# Patient Record
Sex: FEMALE | Race: WHITE | Employment: OTHER | URBAN - METROPOLITAN AREA
[De-identification: names, ages, dates, MRNs, and addresses within clinical notes are randomized per-mention and may not be internally consistent; named-entity substitution may affect disease eponyms.]

---

## 2017-02-10 DIAGNOSIS — M15.9 PRIMARY OSTEOARTHRITIS INVOLVING MULTIPLE JOINTS: ICD-10-CM

## 2017-02-10 DIAGNOSIS — I10 ESSENTIAL HYPERTENSION WITH GOAL BLOOD PRESSURE LESS THAN 140/90: ICD-10-CM

## 2017-02-10 RX ORDER — LOSARTAN POTASSIUM AND HYDROCHLOROTHIAZIDE 12.5; 5 MG/1; MG/1
TABLET ORAL
Qty: 30 TAB | Refills: 2 | Status: SHIPPED | OUTPATIENT
Start: 2017-02-10 | End: 2017-02-28 | Stop reason: SDUPTHER

## 2017-02-10 RX ORDER — TRAMADOL HYDROCHLORIDE 50 MG/1
TABLET ORAL
Qty: 60 TAB | Refills: 1 | Status: SHIPPED | OUTPATIENT
Start: 2017-02-10 | End: 2017-10-04 | Stop reason: SDUPTHER

## 2017-02-10 NOTE — TELEPHONE ENCOUNTER
Nurse spoke with pt in regards to message below, pt states she will call and schedule follow up appointment as soon as possible.

## 2017-02-10 NOTE — TELEPHONE ENCOUNTER
Patient is calling, patient was requesting for an appointment today with MIGUELITO Bhatia. Advised patient that the only appointment MIGUELITO Mac has is a 9:45 this morning. Patient states that she is unable to do this due to her  being bed ridden and the [facility] nurse not getting to there home before 9am to help. Patient is requesting that a nurse give her a call back, she states that she is completely out of her blood pressure medication and Tramadol and knows that MIGUELITO Mac wants her to come in before he fills it but there is just no way that she can come in before the [facility] nurse comes to her home.      Best call back # for patient: [phone]

## 2017-02-10 NOTE — TELEPHONE ENCOUNTER
[address] [place] [address] on file at [phone]. Script taken, read back and verified by \"Ranjeet\".

## 2017-02-28 ENCOUNTER — OFFICE VISIT (OUTPATIENT)
Dept: FAMILY MEDICINE CLINIC | Age: 82
End: 2017-02-28

## 2017-02-28 ENCOUNTER — HOSPITAL ENCOUNTER (OUTPATIENT)
Dept: LAB | Age: 82
Discharge: HOME | End: 2017-02-28
Payer: MEDICARE

## 2017-02-28 VITALS
HEIGHT: 65 IN | SYSTOLIC BLOOD PRESSURE: 111 MMHG | OXYGEN SATURATION: 94 % | TEMPERATURE: 98 F | BODY MASS INDEX: 26.49 KG/M2 | DIASTOLIC BLOOD PRESSURE: 70 MMHG | WEIGHT: 159 LBS | HEART RATE: 62 BPM | RESPIRATION RATE: 12 BRPM

## 2017-02-28 DIAGNOSIS — E78.2 MIXED HYPERLIPIDEMIA: ICD-10-CM

## 2017-02-28 DIAGNOSIS — F51.01 PRIMARY INSOMNIA: ICD-10-CM

## 2017-02-28 DIAGNOSIS — I10 ESSENTIAL HYPERTENSION: Primary | ICD-10-CM

## 2017-02-28 DIAGNOSIS — M15.9 PRIMARY OSTEOARTHRITIS INVOLVING MULTIPLE JOINTS: ICD-10-CM

## 2017-02-28 DIAGNOSIS — E55.9 VITAMIN D DEFICIENCY: ICD-10-CM

## 2017-02-28 DIAGNOSIS — N18.30 CKD (CHRONIC KIDNEY DISEASE) STAGE 3, GFR 30-59 ML/MIN (HCC): ICD-10-CM

## 2017-02-28 PROCEDURE — 82306 VITAMIN D 25 HYDROXY: CPT

## 2017-02-28 PROCEDURE — 80053 COMPREHEN METABOLIC PANEL: CPT

## 2017-02-28 RX ORDER — ERGOCALCIFEROL 1.25 MG/1
50000 CAPSULE ORAL
Qty: 12 CAP | Refills: 3 | Status: SHIPPED | OUTPATIENT
Start: 2017-02-28 | End: 2017-08-04

## 2017-02-28 RX ORDER — LOSARTAN POTASSIUM AND HYDROCHLOROTHIAZIDE 12.5; 5 MG/1; MG/1
0.5 TABLET ORAL DAILY
Qty: 30 TAB | Refills: 5 | Status: SHIPPED | OUTPATIENT
Start: 2017-02-28 | End: 2017-10-04 | Stop reason: SDUPTHER

## 2017-02-28 NOTE — PROGRESS NOTES
1. Have you been to the ER, urgent care clinic since your last visit? Hospitalized since your last visit? No    2. Have you seen or consulted any other health care providers outside of the [address] [place] [address] since your last visit? Include any pap smears or colon screening.  No     Chief Complaint   Patient presents with    Hypertension    Flank Pain     had a fall last week wednesday- had a bruise on right leg, and feels sore just under right breast

## 2017-02-28 NOTE — PROGRESS NOTES
HISTORY OF PRESENT ILLNESS  Edin Solis is a 80 y.o. female. HPI  Cardiovascular Review:  The patient in for follow up of hypertension. Taking Losartan-HCTZ 50-12.5 mg 1/2 tablet daily with adequate control. Home readings 130/80s. Diet and Lifestyle: generally follows a low fat low cholesterol diet, generally follows a low sodium diet, sedentary, nonsmoker  Pertinent ROS: taking medications as instructed, no medication side effects noted, no TIA's, no chest pain on exertion, no dyspnea on exertion, no swelling of ankles. Osteopenia  Patient is here for evaluation of osteoporosis. She has not had history of fracture. Repeat bone density deferred by patient. Patient does not exercise daily. Family history is positive for osteoporosis. Patient does not smoke. Patient not taking Calcium and Vitamin D regularly. Osteoarthritis and Chronic Pain:  Patient has osteoarthritis, primarily affecting the back. Symptoms onset: problem is longstanding. Rheumatological ROS: using NSAID medication intermittently as needed and using Tramadol intermittently. Response to treatment plan: symptoms have progressed to a point and plateaued. Current Outpatient Prescriptions   Medication Sig Dispense Refill    losartan-hydroCHLOROthiazide (HYZAAR) 50-12.5 mg per tablet Take 0.5 Tabs by mouth daily. For blood pressure 30 Tab 5    ergocalciferol (ERGOCALCIFEROL) 50,000 unit capsule Take 1 Cap by mouth every seven (7) days. 12 Cap 3    traMADol (ULTRAM) 50 mg tablet TAKE 1 TABLET BY MOUTH EVERY 8 HOURS AS NEEDED FOR PAIN 60 Tab 1    Calcium Carbonate-Vit D3-Min 600 mg calcium- 400 unit tab Take 1 Tab by mouth two (2) times a day. For calcium and vitamin D 60 Tab prn    aspirin (ASPIRIN) 325 mg tablet Take 325 mg by mouth daily.  ibuprofen (ADVIL) 200 mg tablet Take 200 mg by mouth every eight (8) hours as needed for Pain.       travoprost (TRAVATAN Z) 0.004 % ophthalmic solution Administer 1 Drop to both eyes every evening.  dorzolamide (TRUSOPT) 2 % ophthalmic solution Administer 2 Drops to both eyes two (2) times a day. Indications: OPEN ANGLE GLAUCOMA      ALPHAGAN P 0.1 % Drop Apply 2 Drops to eye two (2) times a day. Past Medical History:   Diagnosis Date    AR (allergic rhinitis) 5/4/2010    Asthmatic bronchitis 5/4/2010    DJD (degenerative joint disease) 5/4/2010    Glaucoma 5/4/2010    HTN (hypertension) 5/4/2010    Mixed hyperlipidemia     normal particle number 4/2012    Osteopenia 5/4/2010    TMJ (temporomandibular joint disorder) 5/4/2010    Viral labyrinthitis 5/4/2010      Lab Results   Component Value Date/Time    Cholesterol, total 210 03/17/2015 08:54 AM    HDL Cholesterol 78 03/17/2015 08:54 AM    LDL, calculated 109 03/17/2015 08:54 AM    Triglyceride 117 03/17/2015 08:54 AM    CHOL/HDL Ratio 3.9 11/05/2010 08:31 AM     Lab Results   Component Value Date/Time    GFR est AA 54 09/21/2016 12:55 PM    GFR est non-AA 47 09/21/2016 12:55 PM    Creatinine 1.09 09/21/2016 12:55 PM    BUN 26 09/21/2016 12:55 PM    Sodium 141 09/21/2016 12:55 PM    Potassium 3.9 09/21/2016 12:55 PM    Chloride 102 09/21/2016 12:55 PM    CO2 24 09/21/2016 12:55 PM         Review of Systems   Constitutional: Negative for malaise/fatigue and weight loss. Respiratory: Negative for shortness of breath. Cardiovascular: Negative for chest pain, palpitations and leg swelling. Musculoskeletal: Positive for back pain and joint pain. Negative for myalgias. Neurological: Negative for weakness and headaches. Psychiatric/Behavioral: Negative for depression. The patient is not nervous/anxious and does not have insomnia. Physical Exam   Constitutional: She is oriented to person, place, and time. She appears well-developed and well-nourished. Neck: Normal range of motion. Neck supple. No JVD present. Carotid bruit is not present. No thyromegaly present.    Cardiovascular: Normal rate, regular rhythm and intact distal pulses. Exam reveals no gallop and no friction rub. No murmur heard. Pulmonary/Chest: Effort normal and breath sounds normal. No respiratory distress. Musculoskeletal: She exhibits no edema. Lymphadenopathy:     She has no cervical adenopathy. Neurological: She is alert and oriented to person, place, and time. Psychiatric: She has a normal mood and affect. Her behavior is normal.   Nursing note and vitals reviewed. ASSESSMENT and PLAN  Norah Hassan was seen today for hypertension and flank pain. Diagnoses and all orders for this visit:    Essential hypertension  Well controlled, no medication changes. -     losartan-hydroCHLOROthiazide (HYZAAR) 50-12.5 mg per tablet; Take 0.5 Tabs by mouth daily. For blood pressure  -     METABOLIC PANEL, COMPREHENSIVE    Vitamin D deficiency  Begin weekly supplement. Recheck level. -     ergocalciferol (ERGOCALCIFEROL) 50,000 unit capsule; Take 1 Cap by mouth every seven (7) days. -     VITAMIN D, 25 HYDROXY    Mixed hyperlipidemia  Stable, no changes to current therapy    CKD (chronic kidney disease) stage 3, GFR 30-59 ml/min  Will monitor. Discussed limiting NSAID use. Primary osteoarthritis involving multiple joints  Stable on Tramadol. I have discussed the diagnosis with the patient and the intended plan as seen in the above orders. The patient has received an after-visit summary along with patient information handout. I have discussed medication side effects and warnings with the patient as well. Follow-up Disposition:  Return in about 6 months (around 8/28/2017) for blood pressure.

## 2017-02-28 NOTE — MR AVS SNAPSHOT
Visit Information Date & Time Provider Department Dept. Phone Encounter #  
 2/28/2017 11:30 AM Mathew Sexton  [facility] [phone] [id number] Follow-up Instructions Return in about 6 months (around 8/28/2017) for blood pressure. Upcoming Health Maintenance Date Due DTaP/Tdap/Td series (1 - Tdap) 9/10/1953 Pneumococcal 65+ Low/Medium Risk (1 of 2 - PCV13) 9/10/1997 INFLUENZA AGE 9 TO ADULT 8/1/2016 MEDICARE YEARLY EXAM 4/13/2017 GLAUCOMA SCREENING Q2Y 8/9/2018 Allergies as of 2/28/2017  Review Complete On: 2/28/2017 By: Mathew Sexton NP Severity Noted Reaction Type Reactions Asmanex Twisthaler [Mometasone]  07/27/2012    Other (comments) Dizziness and mouth sores Fosamax [Alendronate]  05/04/2010    Other (comments) Leg cramps Prednisone  05/17/2011    Anxiety Current Immunizations  Never Reviewed Name Date Influenza High Dose Vaccine PF 10/7/2015 Not reviewed this visit You Were Diagnosed With   
  
 Codes Comments Essential hypertension    -  Primary ICD-10-CM: I10 
ICD-9-CM: 401.9 Vitamin D deficiency     ICD-10-CM: E55.9 ICD-9-CM: 268.9 Mixed hyperlipidemia     ICD-10-CM: E78.2 ICD-9-CM: 272.2 CKD (chronic kidney disease) stage 3, GFR 30-59 ml/min     ICD-10-CM: N18.3 ICD-9-CM: 585.3 Primary osteoarthritis involving multiple joints     ICD-10-CM: M15.0 ICD-9-CM: 715.09 Primary insomnia     ICD-10-CM: F51.01 
ICD-9-CM: 307.42 Vitals BP  
  
  
  
  
  
 111/70 (BP 1 Location: Left arm, BP Patient Position: Sitting) Vitals History BMI and BSA Data Body Mass Index Body Surface Area  
 26.46 kg/m 2 1.82 m 2 Preferred Pharmacy Pharmacy Name Phone [facility] [address] [place] [address], [address] [place] [address] [phone] Your Updated Medication List  
  
   
 This list is accurate as of: 17 12:12 PM.  Always use your most recent med list. ADVIL 200 mg tablet Generic drug:  ibuprofen Take 200 mg by mouth every eight (8) hours as needed for Pain. ALPHAGAN P 0.1 % ophthalmic solution Generic drug:  brimonidine Apply 2 Drops to eye two (2) times a day. aspirin 325 mg tablet Commonly known as:  ASPIRIN Take 325 mg by mouth daily. Calcium Carbonate-Vit D3-Min 600 mg calcium- 400 unit Tab Take 1 Tab by mouth two (2) times a day. For calcium and vitamin D  
  
 dorzolamide 2 % ophthalmic solution Commonly known as:  TRUSOPT Administer 2 Drops to both eyes two (2) times a day. Indications: OPEN ANGLE GLAUCOMA  
  
 ergocalciferol 50,000 unit capsule Commonly known as:  ERGOCALCIFEROL Take 1 Cap by mouth every seven (7) days. losartan-hydroCHLOROthiazide 50-12.5 mg per tablet Commonly known as:  HYZAAR Take 0.5 Tabs by mouth daily. For blood pressure  
  
 traMADol 50 mg tablet Commonly known as:  ULTRAM  
TAKE 1 TABLET BY MOUTH EVERY 8 HOURS AS NEEDED FOR PAIN  
  
 TRAVATAN Z 0.004 % ophthalmic solution Generic drug:  travoprost  
Administer 1 Drop to both eyes every evening. Prescriptions Sent to Pharmacy Refills  
 losartan-hydroCHLOROthiazide (HYZAAR) 50-12.5 mg per tablet 5 Sig: Take 0.5 Tabs by mouth daily. For blood pressure Class: Normal  
 Pharmacy: [facility] [address], [address] [place] of [address] Ph #: [phone] Route: Oral  
 ergocalciferol (ERGOCALCIFEROL) 50,000 unit capsule 3 Sig: Take 1 Cap by mouth every seven (7) days. Class: Normal  
 Pharmacy: [facility] [address], [address] [place] of [address] Ph #: [phone] Route: Oral  
  
Follow-up Instructions Return in about 6 months (around 2017) for blood pressure. Patient Instructions Learning About High Blood Pressure What is high blood pressure? Blood pressure is a measure of how hard the blood pushes against the walls of your arteries. It's normal for blood pressure to go up and down throughout the day, but if it stays up, you have high blood pressure. Another name for high blood pressure is hypertension. Two numbers tell you your blood pressure. The first number is the systolic pressure. It shows how hard the blood pushes when your heart is pumping. The second number is the diastolic pressure. It shows how hard the blood pushes between heartbeats, when your heart is relaxed and filling with blood. A blood pressure of less than 120/80 (say \"120 over 80\") is ideal for an adult. High blood pressure is 140/90 or higher. You have high blood pressure if your top number is 140 or higher or your bottom number is 90 or higher, or both. Many people fall into the category in between, called prehypertension. People with prehypertension need to make lifestyle changes to bring their blood pressure down and help prevent or delay high blood pressure. What happens when you have high blood pressure? · Blood flows through your arteries with too much force. Over time, this damages the walls of your arteries. But you can't feel it. High blood pressure usually doesn't cause symptoms. · Fat and calcium start to build up in your arteries. This buildup is called plaque. Plaque makes your arteries narrower and stiffer. Blood can't flow through them as easily. · This lack of good blood flow starts to damage some of the organs in your body. This can lead to problems such as coronary artery disease and heart attack, heart failure, stroke, kidney failure, and eye damage. How can you prevent high blood pressure? · Stay at a healthy weight. · Try to limit how much sodium you eat to less than 2,300 milligrams (mg) a day.  If you limit your sodium to 1,500 mg a day, you can lower your blood pressure even more. ¨ Buy foods that are labeled \"unsalted,\" \"sodium-free,\" or \"low-sodium. \" Foods labeled \"reduced-sodium\" and \"light sodium\" may still have too much sodium. ¨ Flavor your food with garlic, lemon juice, onion, vinegar, herbs, and spices instead of salt. Do not use soy sauce, steak sauce, onion salt, garlic salt, mustard, or ketchup on your food. ¨ Use less salt (or none) when recipes call for it. You can often use half the salt a recipe calls for without losing flavor. · Be physically active. Get at least 30 minutes of exercise on most days of the week. Walking is a good choice. You also may want to do other activities, such as running, swimming, cycling, or playing tennis or team sports. · Limit alcohol to 2 drinks a day for men and 1 drink a day for women. · Eat plenty of fruits, vegetables, and low-fat dairy products. Eat less saturated and total fats. How is high blood pressure treated? · Your doctor will suggest making lifestyle changes. For example, your doctor may ask you to eat healthy foods, quit smoking, lose extra weight, and be more active. · If lifestyle changes don't help enough or your blood pressure is very high, you will have to take medicine every day. Follow-up care is a key part of your treatment and safety. Be sure to make and go to all appointments, and call your doctor if you are having problems. It's also a good idea to know your test results and keep a list of the medicines you take. Where can you learn more? Go to http://mehreen-grzegozr.info/. Enter P501 in the search box to learn more about \"Learning About High Blood Pressure. \" Current as of: March 23, 2016 Content Version: 11.1 © 2180-1789 [facility]. Care instructions adapted under license by [employer] (which disclaims liability or warranty for this information).  If you have questions about a medical condition or this instruction, always ask your healthcare professional. [address] any warranty or liability for your use of this information. Medicines to Avoid With Kidney Disease: Care Instructions Your Care Instructions Kidney disease means that your kidneys are not able to get rid of waste from the blood. So they can't keep your body's fluids and chemicals in balance. Usually, the kidneys get rid of waste from the blood through the urine. And they balance the fluids in the body. When your kidneys don't work as they should, you have to be careful about some medicines. They may harm your kidneys. Your doctor may tell you not to take them. Or he or she may change the dose. Medicines for pain and swelling, such as ibuprofen (Advil or Motrin) or naproxen (Aleve), can cause harm. So can some antibiotics and antacids. And you need to be careful about some drugs that treat cancer, lower blood pressure, or get rid of water from the body. Some herbal products could cause harm too. Follow-up care is a key part of your treatment and safety. Be sure to make and go to all appointments, and call your doctor if you are having problems. It's also a good idea to know your test results and keep a list of the medicines you take. How can you care for yourself at home? · Tell your doctor all the prescription, herbal, or over-the-counter medicines you take. Do not take any new ones unless you talk to your doctor first. 
· Do not take anti-inflammatory medicines. These include ibuprofen (Advil, Motrin) and naproxen (Aleve). You can use acetaminophen (Tylenol) for pain. · Do not take two or more pain medicines at the same time unless the doctor told you to. Many pain medicines have acetaminophen, which is Tylenol. Too much acetaminophen (Tylenol) can be harmful. · Tell all doctors and others who work with your health care that you have kidney disease. · Wear medical alert jewelry that lists your health problem. You can buy this at most drugstores. Where can you learn more? Go to http://mehreen-grzegorz.info/. Enter W118 in the search box to learn more about \"Medicines to Avoid With Kidney Disease: Care Instructions. \" Current as of: November 20, 2015 Content Version: 11.1 © 1092-6771 [employer]. Care instructions adapted under license by [employer] (which disclaims liability or warranty for this information). If you have questions about a medical condition or this instruction, always ask your healthcare professional. [address] any warranty or liability for your use of this information. Introducing [facility]! Sonia Ford introduces [employer] patient portal. Now you can access parts of your medical record, email your doctor's office, and request medication refills online. 1. In your internet browser, go to [email or web address] 2. Click on the First Time User? Click Here link in the Sign In box. You will see the New Member Sign Up page. 3. Enter your [employer] Access Code exactly as it appears below. You will not need to use this code after youve completed the sign-up process. If you do not sign up before the expiration date, you must request a new code. · [employer] Access Code: [email or web address] Expires: 5/29/2017 12:12 PM 
 
4. Enter the last four digits of your Social Security Number (xxxx) and Date of Birth (mm/dd/yyyy) as indicated and click Submit. You will be taken to the next sign-up page. 5. Create a [employer] ID. This will be your [employer]ik login ID and cannot be changed, so think of one that is secure and easy to remember. 6. Create a [employer] password. You can change your password at any time. 7. Enter your Password Reset Question and Answer. This can be used at a later time if you forget your password. 8. Enter your e-mail address. You will receive e-mail notification when new information is available in [address] 9. Click Sign Up. You can now view and download portions of your medical record. 10. Click the Download Summary menu link to download a portable copy of your medical information. If you have questions, please visit the Frequently Asked Questions section of the [employer] website. Remember, [employer] is NOT to be used for urgent needs. For medical emergencies, dial 911. Now available from your iPhone and Android! Please provide this summary of care documentation to your next provider. Your primary care clinician is listed as Yeison Pisano. If you have any questions after today's visit, please call [phone].

## 2017-02-28 NOTE — PATIENT INSTRUCTIONS
Learning About High Blood Pressure  What is high blood pressure? Blood pressure is a measure of how hard the blood pushes against the walls of your arteries. It's normal for blood pressure to go up and down throughout the day, but if it stays up, you have high blood pressure. Another name for high blood pressure is hypertension. Two numbers tell you your blood pressure. The first number is the systolic pressure. It shows how hard the blood pushes when your heart is pumping. The second number is the diastolic pressure. It shows how hard the blood pushes between heartbeats, when your heart is relaxed and filling with blood. A blood pressure of less than 120/80 (say \"120 over 80\") is ideal for an adult. High blood pressure is 140/90 or higher. You have high blood pressure if your top number is 140 or higher or your bottom number is 90 or higher, or both. Many people fall into the category in between, called prehypertension. People with prehypertension need to make lifestyle changes to bring their blood pressure down and help prevent or delay high blood pressure. What happens when you have high blood pressure? · Blood flows through your arteries with too much force. Over time, this damages the walls of your arteries. But you can't feel it. High blood pressure usually doesn't cause symptoms. · Fat and calcium start to build up in your arteries. This buildup is called plaque. Plaque makes your arteries narrower and stiffer. Blood can't flow through them as easily. · This lack of good blood flow starts to damage some of the organs in your body. This can lead to problems such as coronary artery disease and heart attack, heart failure, stroke, kidney failure, and eye damage. How can you prevent high blood pressure? · Stay at a healthy weight. · Try to limit how much sodium you eat to less than 2,300 milligrams (mg) a day.  If you limit your sodium to 1,500 mg a day, you can lower your blood pressure even more.  ¨ Buy foods that are labeled \"unsalted,\" \"sodium-free,\" or \"low-sodium. \" Foods labeled \"reduced-sodium\" and \"light sodium\" may still have too much sodium. ¨ Flavor your food with garlic, lemon juice, onion, vinegar, herbs, and spices instead of salt. Do not use soy sauce, steak sauce, onion salt, garlic salt, mustard, or ketchup on your food. ¨ Use less salt (or none) when recipes call for it. You can often use half the salt a recipe calls for without losing flavor. · Be physically active. Get at least 30 minutes of exercise on most days of the week. Walking is a good choice. You also may want to do other activities, such as running, swimming, cycling, or playing tennis or team sports. · Limit alcohol to 2 drinks a day for men and 1 drink a day for women. · Eat plenty of fruits, vegetables, and low-fat dairy products. Eat less saturated and total fats. How is high blood pressure treated? · Your doctor will suggest making lifestyle changes. For example, your doctor may ask you to eat healthy foods, quit smoking, lose extra weight, and be more active. · If lifestyle changes don't help enough or your blood pressure is very high, you will have to take medicine every day. Follow-up care is a key part of your treatment and safety. Be sure to make and go to all appointments, and call your doctor if you are having problems. It's also a good idea to know your test results and keep a list of the medicines you take. Where can you learn more? Go to http://mehreen-grzegorz.info/. Enter P501 in the search box to learn more about \"Learning About High Blood Pressure. \"  Current as of: March 23, 2016  Content Version: 11.1  © 5468-4603 [employer]. Care instructions adapted under license by [employer] (which disclaims liability or warranty for this information).  If you have questions about a medical condition or this instruction, always ask your healthcare professional. [email or web address], [facility] disclaims any warranty or liability for your use of this information. Medicines to Avoid With Kidney Disease: Care Instructions  Your Care Instructions  Kidney disease means that your kidneys are not able to get rid of waste from the blood. So they can't keep your body's fluids and chemicals in balance. Usually, the kidneys get rid of waste from the blood through the urine. And they balance the fluids in the body. When your kidneys don't work as they should, you have to be careful about some medicines. They may harm your kidneys. Your doctor may tell you not to take them. Or he or she may change the dose. Medicines for pain and swelling, such as ibuprofen (Advil or Motrin) or naproxen (Aleve), can cause harm. So can some antibiotics and antacids. And you need to be careful about some drugs that treat cancer, lower blood pressure, or get rid of water from the body. Some herbal products could cause harm too. Follow-up care is a key part of your treatment and safety. Be sure to make and go to all appointments, and call your doctor if you are having problems. It's also a good idea to know your test results and keep a list of the medicines you take. How can you care for yourself at home? · Tell your doctor all the prescription, herbal, or over-the-counter medicines you take. Do not take any new ones unless you talk to your doctor first.  · Do not take anti-inflammatory medicines. These include ibuprofen (Advil, Motrin) and naproxen (Aleve). You can use acetaminophen (Tylenol) for pain. · Do not take two or more pain medicines at the same time unless the doctor told you to. Many pain medicines have acetaminophen, which is Tylenol. Too much acetaminophen (Tylenol) can be harmful. · Tell all doctors and others who work with your health care that you have kidney disease. · Wear medical alert jewelry that lists your health problem. You can buy this at most drugsStkr.ites.   Where can you learn more? Go to http://mehreen-grzegorz.info/. Enter R528 in the search box to learn more about \"Medicines to Avoid With Kidney Disease: Care Instructions. \"  Current as of: November 20, 2015  Content Version: 11.1  © 1752-7199 [employer], [employer]. Care instructions adapted under license by [employer] (which disclaims liability or warranty for this information). If you have questions about a medical condition or this instruction, always ask your healthcare professional. [place] [address] any warranty or liability for your use of this information.

## 2017-03-01 LAB
25(OH)D3+25(OH)D2 SERPL-MCNC: 12.1 NG/ML (ref 30–100)
ALBUMIN SERPL-MCNC: 4.1 G/DL (ref 3.5–4.7)
ALBUMIN/GLOB SERPL: 1.8 {RATIO} (ref 1.1–2.5)
ALP SERPL-CCNC: 95 IU/L (ref 39–117)
ALT SERPL-CCNC: 15 IU/L (ref 0–32)
AST SERPL-CCNC: 19 IU/L (ref 0–40)
BILIRUB SERPL-MCNC: 0.3 MG/DL (ref 0–1.2)
BUN SERPL-MCNC: 20 MG/DL (ref 8–27)
BUN/CREAT SERPL: 22 (ref 11–26)
CALCIUM SERPL-MCNC: 9.4 MG/DL (ref 8.7–10.3)
CHLORIDE SERPL-SCNC: 102 MMOL/L (ref 96–106)
CO2 SERPL-SCNC: 26 MMOL/L (ref 18–29)
CREAT SERPL-MCNC: 0.91 MG/DL (ref 0.57–1)
GLOBULIN SER CALC-MCNC: 2.3 G/DL (ref 1.5–4.5)
GLUCOSE SERPL-MCNC: 84 MG/DL (ref 65–99)
INTERPRETATION: NORMAL
POTASSIUM SERPL-SCNC: 4 MMOL/L (ref 3.5–5.2)
PROT SERPL-MCNC: 6.4 G/DL (ref 6–8.5)
SODIUM SERPL-SCNC: 141 MMOL/L (ref 134–144)

## 2017-03-06 ENCOUNTER — TELEPHONE (OUTPATIENT)
Dept: FAMILY MEDICINE CLINIC | Age: 82
End: 2017-03-06

## 2017-03-06 NOTE — TELEPHONE ENCOUNTER
[phone] (home)   Notified patient of her labs per patient vitamin  D 52604 units were $30.00 she said she cant afford that.  Per patient she is taking vitamin D 2000 did you want her to take more than 2000 please advised

## 2017-03-06 NOTE — TELEPHONE ENCOUNTER
----- Message from Lashaun Arrington NP sent at 3/5/2017  8:24 PM EST -----  Patient's kidney function was improved. Her vitamin D level was low. I recommend the prescription supplement of vitamin D3 27694 units once weekly as prescribed.

## 2017-03-06 NOTE — PROGRESS NOTES
Patient's kidney function was improved. Her vitamin D level was low. I recommend the prescription supplement of vitamin D3 79868 units once weekly as prescribed.

## 2017-03-06 NOTE — PROGRESS NOTES
[phone] ([place]) verified   Patient notified of above note and voiced understanding of what was read.

## 2017-04-25 ENCOUNTER — TELEPHONE (OUTPATIENT)
Dept: FAMILY MEDICINE CLINIC | Age: 82
End: 2017-04-25

## 2017-04-25 NOTE — TELEPHONE ENCOUNTER
----- Message from [employer] sent at 4/25/2017  7:15 AM EDT -----  Regarding: MIGUELITO Bhatia/Telephone  Contact: [phone]  Renato Crowder has a \"terrible\" cough that may possibly be bronchitis, pt specifically wants to see NP [facility] 4/25/2017

## 2017-04-25 NOTE — TELEPHONE ENCOUNTER
[phone] spoke to patient advised of [address] being booked today we can get her in 4/26/2017 at 845 A patient understand

## 2017-04-26 ENCOUNTER — OFFICE VISIT (OUTPATIENT)
Dept: FAMILY MEDICINE CLINIC | Age: 82
End: 2017-04-26

## 2017-04-26 VITALS
HEIGHT: 65 IN | DIASTOLIC BLOOD PRESSURE: 72 MMHG | SYSTOLIC BLOOD PRESSURE: 158 MMHG | RESPIRATION RATE: 12 BRPM | OXYGEN SATURATION: 97 % | WEIGHT: 160.2 LBS | TEMPERATURE: 97.2 F | HEART RATE: 76 BPM | BODY MASS INDEX: 26.69 KG/M2

## 2017-04-26 DIAGNOSIS — Z71.89 ACP (ADVANCE CARE PLANNING): ICD-10-CM

## 2017-04-26 DIAGNOSIS — Z00.00 MEDICARE ANNUAL WELLNESS VISIT, SUBSEQUENT: ICD-10-CM

## 2017-04-26 DIAGNOSIS — J40 BRONCHITIS: Primary | ICD-10-CM

## 2017-04-26 DIAGNOSIS — I10 ESSENTIAL HYPERTENSION: ICD-10-CM

## 2017-04-26 RX ORDER — CODEINE PHOSPHATE AND GUAIFENESIN 10; 100 MG/5ML; MG/5ML
5 SOLUTION ORAL
Qty: 120 ML | Refills: 0 | Status: SHIPPED | OUTPATIENT
Start: 2017-04-26 | End: 2017-08-04

## 2017-04-26 RX ORDER — AZITHROMYCIN 250 MG/1
TABLET, FILM COATED ORAL
Qty: 6 TAB | Refills: 0 | Status: SHIPPED | OUTPATIENT
Start: 2017-04-26 | End: 2017-05-01

## 2017-04-26 NOTE — MR AVS SNAPSHOT
Visit Information Date & Time Provider Department Dept. Phone Encounter #  
 4/26/2017  8:45 AM Jose Ramon Doll NP [facility] [phone] [id number] Follow-up Instructions Return if symptoms worsen or fail to improve. Your Appointments 8/29/2017  9:00 AM  
ROUTINE CARE with Jose Ramon Doll NP [facility] ([facility]) Appt Note: 6 months follow up visit [phone]  
[phone]  
  
   
 [phone] Upcoming Health Maintenance Date Due DTaP/Tdap/Td series (1 - Tdap) 9/10/1953 Pneumococcal 65+ Low/Medium Risk (1 of 2 - PCV13) 9/10/1997 INFLUENZA AGE 9 TO ADULT 8/1/2016 MEDICARE YEARLY EXAM 4/13/2017 GLAUCOMA SCREENING Q2Y 8/9/2018 Allergies as of 4/26/2017  Review Complete On: 4/26/2017 By: Jose Ramon Doll NP Severity Noted Reaction Type Reactions Asmanex Twisthaler [Mometasone]  07/27/2012    Other (comments) Dizziness and mouth sores Fosamax [Alendronate]  05/04/2010    Other (comments) Leg cramps Prednisone  05/17/2011    Anxiety Current Immunizations  Never Reviewed Name Date Influenza High Dose Vaccine PF 10/7/2015 Not reviewed this visit You Were Diagnosed With   
  
 Codes Comments Bronchitis    -  Primary ICD-10-CM: Y75 ICD-9-CM: 308 Essential hypertension     ICD-10-CM: I10 
ICD-9-CM: 401.9 Vitals BP Pulse Temp Resp Height(growth percentile) Weight(growth percentile) 158/72 (BP 1 Location: Left arm, BP Patient Position: Sitting) 76 97.2 °F (36.2 °C) (Oral) 12 5' 5\" (1.651 m) 160 lb 3.2 oz (72.7 kg) SpO2 BMI OB Status Smoking Status 97% 26.66 kg/m2 Postmenopausal Former Smoker Vitals History BMI and BSA Data Body Mass Index Body Surface Area  
 26.66 kg/m 2 1.83 m 2 Preferred Pharmacy Pharmacy Name Phone [facility] [address] [place] [address], [facility] Ruben aMys [phone] Your Updated Medication List  
  
   
This list is accurate as of: 4/26/17  9:14 AM.  Always use your most recent med list. ADVIL 200 mg tablet Generic drug:  ibuprofen Take 200 mg by mouth every eight (8) hours as needed for Pain. ALPHAGAN P 0.1 % ophthalmic solution Generic drug:  brimonidine Apply 2 Drops to eye two (2) times a day. aspirin 325 mg tablet Commonly known as:  ASPIRIN Take 325 mg by mouth daily. azithromycin 250 mg tablet Commonly known as:  Elyce Kaushal Take 2 tablets today, then take 1 tablet daily Calcium Carbonate-Vit D3-Min 600 mg calcium- 400 unit Tab Take 1 Tab by mouth two (2) times a day. For calcium and vitamin D  
  
 dorzolamide 2 % ophthalmic solution Commonly known as:  TRUSOPT Administer 2 Drops to both eyes two (2) times a day. Indications: OPEN ANGLE GLAUCOMA  
  
 ergocalciferol 50,000 unit capsule Commonly known as:  ERGOCALCIFEROL Take 1 Cap by mouth every seven (7) days. guaiFENesin-codeine 100-10 mg/5 mL solution Commonly known as:  ROBITUSSIN AC Take 5 mL by mouth three (3) times daily as needed for Cough. Max Daily Amount: 15 mL. losartan-hydroCHLOROthiazide 50-12.5 mg per tablet Commonly known as:  HYZAAR Take 0.5 Tabs by mouth daily. For blood pressure  
  
 traMADol 50 mg tablet Commonly known as:  ULTRAM  
TAKE 1 TABLET BY MOUTH EVERY 8 HOURS AS NEEDED FOR PAIN  
  
 TRAVATAN Z 0.004 % ophthalmic solution Generic drug:  travoprost  
Administer 1 Drop to both eyes every evening. Prescriptions Printed Refills  
 azithromycin (ZITHROMAX) 250 mg tablet 0 Sig: Take 2 tablets today, then take 1 tablet daily  Class: Print  
 guaiFENesin-codeine (ROBITUSSIN AC) 100-10 mg/5 mL solution 0  
 Sig: Take 5 mL by mouth three (3) times daily as needed for Cough. Max Daily Amount: 15 mL. Class: Print Route: Oral  
  
Follow-up Instructions Return if symptoms worsen or fail to improve. Patient Instructions Bronchitis: Care Instructions Your Care Instructions Bronchitis is inflammation of the bronchial tubes, which carry air to the lungs. The tubes swell and produce mucus, or phlegm. The mucus and inflamed bronchial tubes make you cough. You may have trouble breathing. Most cases of bronchitis are caused by viruses like those that cause colds. Antibiotics usually do not help and they may be harmful. Bronchitis usually develops rapidly and lasts about 2 to 3 weeks in otherwise healthy people. Follow-up care is a key part of your treatment and safety. Be sure to make and go to all appointments, and call your doctor if you are having problems. It's also a good idea to know your test results and keep a list of the medicines you take. How can you care for yourself at home? · Take all medicines exactly as prescribed. Call your doctor if you think you are having a problem with your medicine. · Get some extra rest. 
· Take an over-the-counter pain medicine, such as acetaminophen (Tylenol), ibuprofen (Advil, Motrin), or naproxen (Aleve) to reduce fever and relieve body aches. Read and follow all instructions on the label. · Do not take two or more pain medicines at the same time unless the doctor told you to. Many pain medicines have acetaminophen, which is Tylenol. Too much acetaminophen (Tylenol) can be harmful. · Take an over-the-counter cough medicine that contains dextromethorphan to help quiet a dry, hacking cough so that you can sleep. Avoid cough medicines that have more than one active ingredient. Read and follow all instructions on the label.  
· Breathe moist air from a humidifier, hot shower, or sink filled with hot water. The heat and moisture will thin mucus so you can cough it out. · Do not smoke. Smoking can make bronchitis worse. If you need help quitting, talk to your doctor about stop-smoking programs and medicines. These can increase your chances of quitting for good. When should you call for help? Call 911 anytime you think you may need emergency care. For example, call if: 
· You have severe trouble breathing. Call your doctor now or seek immediate medical care if: 
· You have new or worse trouble breathing. · You cough up dark brown or bloody mucus (sputum). · You have a new or higher fever. · You have a new rash. Watch closely for changes in your health, and be sure to contact your doctor if: 
· You cough more deeply or more often, especially if you notice more mucus or a change in the color of your mucus. · You are not getting better as expected. Where can you learn more? Go to http://mehreen-grzegorz.info/. Enter H333 in the search box to learn more about \"Bronchitis: Care Instructions. \" Current as of: May 23, 2016 Content Version: 11.2 © 9869-8271 [employer]. Care instructions adapted under license by [employer] (which disclaims liability or warranty for this information). If you have questions about a medical condition or this instruction, always ask your healthcare professional. [address] any warranty or liability for your use of this information. Introducing [facility]! Adebayo Ram introduces Yapmo patient portal. Now you can access parts of your medical record, email your doctor's office, and request medication refills online. 1. In your internet browser, go to [email or web address]. [email or web address] 2. Click on the First Time User? Click Here link in the Sign In box. You will see the New Member Sign Up page. 3. Enter your Yapmo Access Code exactly as it appears below.  You will not need to use this code after youve completed the sign-up process. If you do not sign up before the expiration date, you must request a new code. · [employer] Access Code: [email or web address] Expires: 5/29/2017  1:12 PM 
 
4. Enter the last four digits of your Social Security Number (xxxx) and Date of Birth (mm/dd/yyyy) as indicated and click Submit. You will be taken to the next sign-up page. 5. Create a [employer] ID. This will be your [employer] login ID and cannot be changed, so think of one that is secure and easy to remember. 6. Create a [employer] password. You can change your password at any time. 7. Enter your Password Reset Question and Answer. This can be used at a later time if you forget your password. 8. Enter your e-mail address. You will receive e-mail notification when new information is available in [address]. 9. Click Sign Up. You can now view and download portions of your medical record. 10. Click the Download Summary menu link to download a portable copy of your medical information. If you have questions, please visit the Frequently Asked Questions section of the [employer] website. Remember, [employer] is NOT to be used for urgent needs. For medical emergencies, dial 911. Now available from your iPhone and Android! Please provide this summary of care documentation to your next provider. Your primary care clinician is listed as Lesly Stephenson. If you have any questions after today's visit, please call [phone].

## 2017-04-26 NOTE — PROGRESS NOTES
1. Have you been to the ER, urgent care clinic since your last visit? Hospitalized since your last visit? No    2. Have you seen or consulted any other health care providers outside of the [address] [place] [address] since your last visit? Include any pap smears or colon screening.  No     Chief Complaint   Patient presents with    Cough     affecting sleep- producing a clear mucus- began on saturday

## 2017-04-26 NOTE — PROGRESS NOTES
Nery Domingo is a 80 y.o. female and presents for annual Medicare Wellness Visit. Problem List: Reviewed with patient and discussed risk factors. Patient Active Problem List   Diagnosis Code    DJD (degenerative joint disease) M19.90    HTN (hypertension) I10    AR (allergic rhinitis) J30.9    Glaucoma H40.9    TMJ (temporomandibular joint disorder) M26.609    Osteopenia M85.80    Asthmatic bronchitis J45.909    Mixed hyperlipidemia E78.2    ACP (advance care planning) Z71.89       Current medical providers:  Patient Care Team:  Angel Ortiz NP as PCP - General (Nurse Practitioner)    PSH: Reviewed with patient  Past Surgical History:   Procedure Laterality Date    ENDOSCOPY, COLON, DIAGNOSTIC      HX APPENDECTOMY      HX HERNIA REPAIR      HX MOHS PROCEDURES      HX TUBAL LIGATION      HX WRIST FRACTURE [address]  08/16/13        SH: Reviewed with patient  Social History   Substance Use Topics    Smoking status: Former Smoker     Packs/day: 0.20     Years: 3.00    Smokeless tobacco: Never Used    Alcohol use No       FH: Reviewed with patient  History reviewed. No pertinent family history. Medications/Allergies: Reviewed with patient  Current Outpatient Prescriptions on File Prior to Visit   Medication Sig Dispense Refill    losartan-hydroCHLOROthiazide (HYZAAR) 50-12.5 mg per tablet Take 0.5 Tabs by mouth daily. For blood pressure 30 Tab 5    ergocalciferol (ERGOCALCIFEROL) 50,000 unit capsule Take 1 Cap by mouth every seven (7) days. 12 Cap 3    traMADol (ULTRAM) 50 mg tablet TAKE 1 TABLET BY MOUTH EVERY 8 HOURS AS NEEDED FOR PAIN 60 Tab 1    Calcium Carbonate-Vit D3-Min 600 mg calcium- 400 unit tab Take 1 Tab by mouth two (2) times a day. For calcium and vitamin D 60 Tab prn    aspirin (ASPIRIN) 325 mg tablet Take 325 mg by mouth daily.  ibuprofen (ADVIL) 200 mg tablet Take 200 mg by mouth every eight (8) hours as needed for Pain.       travoprost (TRAVATAN Z) 0.004 % ophthalmic solution Administer 1 Drop to both eyes every evening.  dorzolamide (TRUSOPT) 2 % ophthalmic solution Administer 2 Drops to both eyes two (2) times a day. Indications: OPEN ANGLE GLAUCOMA      ALPHAGAN P 0.1 % Drop Apply 2 Drops to eye two (2) times a day. No current facility-administered medications on file prior to visit. Allergies   Allergen Reactions    Asmanex Twisthaler [Mometasone] Other (comments)     Dizziness and mouth sores    Fosamax [Alendronate] Other (comments)     Leg cramps    Prednisone Anxiety       Objective:  Visit Vitals    /72 (BP 1 Location: Left arm, BP Patient Position: Sitting)    Pulse 76    Temp 97.2 °F (36.2 °C) (Oral)    Resp 12    Ht 5' 5\" (1.651 m)    Wt 160 lb 3.2 oz (72.7 kg)    SpO2 97%    BMI 26.66 kg/m2    Body mass index is 26.66 kg/(m^2). Assessment of cognitive impairment: Alert and oriented x 3    Depression Screen:   PHQ 2 / 9, over the last two weeks 4/26/2017   Little interest or pleasure in doing things Not at all   Feeling down, depressed or hopeless Not at all   Total Score PHQ 2 0       Fall Risk Assessment:    Fall Risk Assessment, last 12 mths 4/26/2017   Able to walk? Yes   Fall in past 12 months? No   Fall with injury? -   Number of falls in past 12 months -   Fall Risk Score -       Functional Ability:   Does the patient exhibit a steady gait? yes   How long did it take the patient to get up and walk from a sitting position? 5 sec   Is the patient self reliant?  (ie can do own laundry, meals, household chores)  yes     Does the patient handle his/her own medications? yes     Does the patient handle his/her own money? yes     Is the patients home safe (ie good lighting, handrails on stairs and bath, etc.)? yes     Did you notice or did patient express any hearing difficulties? no     Did you notice or did patient express any vision difficulties?   no     Were distance and reading eye charts used?   no Advance Care Planning:   Patient was offered the opportunity to discuss advance care planning:  yes     Does patient have an Advance Directive:  yes   If no, did you provide information on Caring Connections?  no       Plan:      Orders Placed This Encounter    azithromycin (ZITHROMAX) 250 mg tablet    guaiFENesin-codeine (ROBITUSSIN AC) 100-10 mg/5 mL solution       Health Maintenance   Topic Date Due    DTaP/Tdap/Td series (1 - Tdap) 09[birth year]10/1953    Pneumococcal 65+ Low/Medium Risk (1 of 2 - PCV13) 09/10/1997    MEDICARE YEARLY EXAM  04/13/2017    GLAUCOMA SCREENING Q2Y  08/09/2018    OSTEOPOROSIS SCREENING (DEXA)  Addressed    ZOSTER VACCINE AGE 60>  Completed    INFLUENZA AGE 9 TO ADULT  Addressed       *Patient verbalized understanding and agreement with the plan. A copy of the After Visit Summary with personalized health plan was given to the patient today.

## 2017-04-26 NOTE — PATIENT INSTRUCTIONS
Bronchitis: Care Instructions  Your Care Instructions    Bronchitis is inflammation of the bronchial tubes, which carry air to the lungs. The tubes swell and produce mucus, or phlegm. The mucus and inflamed bronchial tubes make you cough. You may have trouble breathing. Most cases of bronchitis are caused by viruses like those that cause colds. Antibiotics usually do not help and they may be harmful. Bronchitis usually develops rapidly and lasts about 2 to 3 weeks in otherwise healthy people. Follow-up care is a key part of your treatment and safety. Be sure to make and go to all appointments, and call your doctor if you are having problems. It's also a good idea to know your test results and keep a list of the medicines you take. How can you care for yourself at home? · Take all medicines exactly as prescribed. Call your doctor if you think you are having a problem with your medicine. · Get some extra rest.  · Take an over-the-counter pain medicine, such as acetaminophen (Tylenol), ibuprofen (Advil, Motrin), or naproxen (Aleve) to reduce fever and relieve body aches. Read and follow all instructions on the label. · Do not take two or more pain medicines at the same time unless the doctor told you to. Many pain medicines have acetaminophen, which is Tylenol. Too much acetaminophen (Tylenol) can be harmful. · Take an over-the-counter cough medicine that contains dextromethorphan to help quiet a dry, hacking cough so that you can sleep. Avoid cough medicines that have more than one active ingredient. Read and follow all instructions on the label. · Breathe moist air from a humidifier, hot shower, or sink filled with hot water. The heat and moisture will thin mucus so you can cough it out. · Do not smoke. Smoking can make bronchitis worse. If you need help quitting, talk to your doctor about stop-smoking programs and medicines. These can increase your chances of quitting for good.   When should you call for help? Call 911 anytime you think you may need emergency care. For example, call if:  · You have severe trouble breathing. Call your doctor now or seek immediate medical care if:  · You have new or worse trouble breathing. · You cough up dark brown or bloody mucus (sputum). · You have a new or higher fever. · You have a new rash. Watch closely for changes in your health, and be sure to contact your doctor if:  · You cough more deeply or more often, especially if you notice more mucus or a change in the color of your mucus. · You are not getting better as expected. Where can you learn more? Go to http://mehreen-grzegorz.info/. Enter H333 in the search box to learn more about \"Bronchitis: Care Instructions. \"  Current as of: May 23, 2016  Content Version: 11.2  © 5605-1783 [employer]. Care instructions adapted under license by [employer] (which disclaims liability or warranty for this information). If you have questions about a medical condition or this instruction, always ask your healthcare professional. [address] any warranty or liability for your use of this information.

## 2017-04-26 NOTE — PROGRESS NOTES
Lillie Oleary is a 80 y.o. female who was seen in clinic today (4/26/2017). Subjective:  Upper Respiratory Infection  Patient complains of symptoms of a URI. Symptoms include cough. Onset of symptoms was 2 days ago, unchanged since that time. She also c/o productive cough with  clear colored sputum for the past 2 days . She is drinking plenty of fluids. . Evaluation to date: none. Treatment to date: none. Prior to Admission medications    Medication Sig Start Date End Date Taking? Authorizing Provider   azithromycin (ZITHROMAX) 250 mg tablet Take 2 tablets today, then take 1 tablet daily 4/26/17 5/1/17 Yes Ana Paula Gloria NP   guaiFENesin-codeine (ROBITUSSIN AC) 100-10 mg/5 mL solution Take 5 mL by mouth three (3) times daily as needed for Cough. Max Daily Amount: 15 mL. 4/26/17  Yes Ana Paula Gloria NP   losartan-hydroCHLOROthiazide (HYZAAR) 50-12.5 mg per tablet Take 0.5 Tabs by mouth daily. For blood pressure 2/28/17  Yes Ana Paula Gloria NP   ergocalciferol (ERGOCALCIFEROL) 50,000 unit capsule Take 1 Cap by mouth every seven (7) days. 2/28/17  Yes Ana Paula Gloria NP   traMADol (ULTRAM) 50 mg tablet TAKE 1 TABLET BY MOUTH EVERY 8 HOURS AS NEEDED FOR PAIN 2/10/17  Yes Ana Paula Gloria NP   Calcium Carbonate-Vit D3-Min 600 mg calcium- 400 unit tab Take 1 Tab by mouth two (2) times a day. For calcium and vitamin D 10/9/15  Yes AnaP aula Gloria NP   aspirin (ASPIRIN) 325 mg tablet Take 325 mg by mouth daily. Yes Historical Provider   ibuprofen (ADVIL) 200 mg tablet Take 200 mg by mouth every eight (8) hours as needed for Pain. Yes Historical Provider   travoprost (TRAVATAN Z) 0.004 % ophthalmic solution Administer 1 Drop to both eyes every evening. Yes Historical Provider   dorzolamide (TRUSOPT) 2 % ophthalmic solution Administer 2 Drops to both eyes two (2) times a day.  Indications: OPEN ANGLE GLAUCOMA   Yes Historical Provider   ALPHAGAN P 0.1 % Drop Apply 2 Drops to eye two (2) times a day. 2/25/11  Yes Historical Provider          Allergies   Allergen Reactions    Asmanex Twisthaler [Mometasone] Other (comments)     Dizziness and mouth sores    Fosamax [Alendronate] Other (comments)     Leg cramps    Prednisone Anxiety           ROS  See HPI    Objective:   Physical Exam   Constitutional: She is oriented to person, place, and time. She appears well-developed and well-nourished. No distress. HENT:   Right Ear: Tympanic membrane and ear canal normal.   Left Ear: Tympanic membrane and ear canal normal.   Nose: Mucosal edema present. Right sinus exhibits no maxillary sinus tenderness and no frontal sinus tenderness. Left sinus exhibits no maxillary sinus tenderness and no frontal sinus tenderness. Mouth/Throat: Oropharynx is clear and moist.   Cardiovascular: Normal rate, regular rhythm and normal heart sounds. No murmur heard. Pulmonary/Chest: Effort normal and breath sounds normal. She has no decreased breath sounds. She has no wheezes. She has no rhonchi. Lymphadenopathy:     She has no cervical adenopathy. Neurological: She is alert and oriented to person, place, and time. Psychiatric: She has a normal mood and affect. Her behavior is normal.   Nursing note and vitals reviewed. Visit Vitals    /72 (BP 1 Location: Left arm, BP Patient Position: Sitting)    Pulse 76    Temp 97.2 °F (36.2 °C) (Oral)    Resp 12    Ht 5' 5\" (1.651 m)    Wt 160 lb 3.2 oz (72.7 kg)    SpO2 97%    BMI 26.66 kg/m2       Assessment & Plan:  Kiannicole Hinojosas was seen today for cough. Diagnoses and all orders for this visit:    Bronchitis  Discussed that symptoms were viral and recommended treating symptoms. Increase fluids and rest. Reviewed OTC products that patient could take. -     Delay fill azithromycin (ZITHROMAX) 250 mg tablet; Take 2 tablets today, then take 1 tablet daily  -     guaiFENesin-codeine (ROBITUSSIN AC) 100-10 mg/5 mL solution;  Take 5 mL by mouth three (3) times daily as needed for Cough. Max Daily Amount: 15 mL. Essential hypertension  Stable, no changes to current therapy    Medicare annual wellness visit, subsequent    ACP (advance care planning)  Patient has a living will, recommended adding a copy to medical record      I have discussed the diagnosis with the patient and the intended plan as seen in the above orders. The patient has received an after-visit summary along with patient information handout. I have discussed medication side effects and warnings with the patient as well. Follow-up Disposition:  Return if symptoms worsen or fail to improve. I have discussed the diagnosis with the patient and the intended plan as seen in the above orders. The patient has received an after-visit summary along with patient information handout. I have discussed medication side effects and warnings with the patient as well. Follow-up Disposition:  Return if symptoms worsen or fail to improve.         Ty Ny NP

## 2017-08-04 ENCOUNTER — OFFICE VISIT (OUTPATIENT)
Dept: FAMILY MEDICINE CLINIC | Age: 82
End: 2017-08-04

## 2017-08-04 VITALS
OXYGEN SATURATION: 93 % | WEIGHT: 165.4 LBS | TEMPERATURE: 98.2 F | HEIGHT: 65 IN | DIASTOLIC BLOOD PRESSURE: 80 MMHG | SYSTOLIC BLOOD PRESSURE: 147 MMHG | BODY MASS INDEX: 27.56 KG/M2 | RESPIRATION RATE: 12 BRPM | HEART RATE: 71 BPM

## 2017-08-04 DIAGNOSIS — I10 ESSENTIAL HYPERTENSION: Primary | ICD-10-CM

## 2017-08-04 DIAGNOSIS — M85.89 OSTEOPENIA OF MULTIPLE SITES: ICD-10-CM

## 2017-08-04 DIAGNOSIS — E55.9 VITAMIN D DEFICIENCY: ICD-10-CM

## 2017-08-04 DIAGNOSIS — Z23 ENCOUNTER FOR IMMUNIZATION: ICD-10-CM

## 2017-08-04 DIAGNOSIS — M54.12 CERVICAL RADICULOPATHY: ICD-10-CM

## 2017-08-04 NOTE — MR AVS SNAPSHOT
Visit Information Date & Time Provider Department Dept. Phone Encounter #  
 8/4/2017 11:30 AM Fadumo Hightower NP [facility] [phone] [id number] Follow-up Instructions Return in about 6 months (around 2/4/2018) for blood pressure. Upcoming Health Maintenance Date Due Pneumococcal 65+ Low/Medium Risk (1 of 2 - PCV13) 9/10/1997 INFLUENZA AGE 9 TO ADULT 8/1/2017 MEDICARE YEARLY EXAM 4/27/2018 GLAUCOMA SCREENING Q2Y 8/9/2018 DTaP/Tdap/Td series (2 - Td) 8/4/2027 Allergies as of 8/4/2017  Review Complete On: 8/4/2017 By: Fadumo Hightower NP Severity Noted Reaction Type Reactions Asmanex Twisthaler [Mometasone]  07/27/2012    Other (comments) Dizziness and mouth sores Fosamax [Alendronate]  05/04/2010    Other (comments) Leg cramps Prednisone  05/17/2011    Anxiety Current Immunizations  Never Reviewed Name Date Influenza High Dose Vaccine PF 10/7/2015 Not reviewed this visit You Were Diagnosed With   
  
 Codes Comments Essential hypertension    -  Primary ICD-10-CM: I10 
ICD-9-CM: 401.9 Vitamin D deficiency     ICD-10-CM: E55.9 ICD-9-CM: 268.9 Cervical radiculopathy     ICD-10-CM: M54.12 
ICD-9-CM: 723.4 Encounter for immunization     ICD-10-CM: L78 ICD-9-CM: V03.89 Osteopenia of multiple sites     ICD-10-CM: M85.89 ICD-9-CM: 733.90 Vitals BP Pulse Temp Resp Height(growth percentile) Weight(growth percentile) 147/80 (BP 1 Location: Left arm, BP Patient Position: Sitting) 71 98.2 °F (36.8 °C) (Oral) 12 5' 5\" (1.651 m) 165 lb 6.4 oz (75 kg) SpO2 BMI OB Status Smoking Status 93% 27.52 kg/m2 Postmenopausal Former Smoker BMI and BSA Data Body Mass Index Body Surface Area  
 27.52 kg/m 2 1.85 m 2 Preferred Pharmacy Pharmacy Name Phone  [id number] AT [address] [place] [address] [address] [facility] [address] [phone] Your Updated Medication List  
  
   
This list is accurate as of: 17 12:19 PM.  Always use your most recent med list. ADVIL 200 mg tablet Generic drug:  ibuprofen Take 200 mg by mouth every eight (8) hours as needed for Pain. ALPHAGAN P 0.1 % ophthalmic solution Generic drug:  brimonidine Apply 2 Drops to eye two (2) times a day. aspirin 325 mg tablet Commonly known as:  ASPIRIN Take 325 mg by mouth daily. Calcium Carbonate-Vit D3-Min 600 mg calcium- 400 unit Tab Take 1 Tab by mouth two (2) times a day. For calcium and vitamin D  
  
 dorzolamide 2 % ophthalmic solution Commonly known as:  TRUSOPT Administer 2 Drops to both eyes two (2) times a day. Indications: OPEN ANGLE GLAUCOMA  
  
 losartan-hydroCHLOROthiazide 50-12.5 mg per tablet Commonly known as:  HYZAAR Take 0.5 Tabs by mouth daily. For blood pressure  
  
 pneumococcal 13 junie conj dip 0.5 mL Syrg injection Commonly known as:  PREVNAR-13  
0.5 mL by IntraMUSCular route once for 1 dose. traMADol 50 mg tablet Commonly known as:  ULTRAM  
TAKE 1 TABLET BY MOUTH EVERY 8 HOURS AS NEEDED FOR PAIN  
  
 TRAVATAN Z 0.004 % ophthalmic solution Generic drug:  travoprost  
Administer 1 Drop to both eyes every evening. Prescriptions Printed Refills  
 pneumococcal 13 junie conj dip (PREVNAR-13) 0.5 mL syrg injection 0 Si.5 mL by IntraMUSCular route once for 1 dose. Class: Print Route: IntraMUSCular Follow-up Instructions Return in about 6 months (around 2018) for blood pressure. Patient Instructions Pinched Nerve in the Neck: Care Instructions Your Care Instructions A pinched nerve in the neck happens when a vertebra or disc in the upper part of your spine is damaged. This damage can happen because of an injury. Or it can just happen with age. The changes caused by the damage may put pressure on a nearby nerve root, pinching it. This causes symptoms such as sharp pain in your neck, shoulder, arm, or back. You may also have tingling or numbness. Sometimes it makes your arm weaker. The symptoms are usually worse when you turn your head or strain your neck. For many people, the symptoms get better over time and finally go away. Early treatment usually includes medicines for pain and swelling. Sometimes physical therapy and special exercises may help. Follow-up care is a key part of your treatment and safety. Be sure to make and go to all appointments, and call your doctor if you are having problems. It's also a good idea to know your test results and keep a list of the medicines you take. How can you care for yourself at home? · Be safe with medicines. Read and follow all instructions on the label. ¨ If the doctor gave you a prescription medicine for pain, take it as prescribed. ¨ If you are not taking a prescription pain medicine, ask your doctor if you can take an over-the-counter medicine. · Try using a heating pad on a low or medium setting for 15 to 20 minutes every 2 or 3 hours. Try a warm shower in place of one session with the heating pad. You can also buy single-use heat wraps that last up to 8 hours. · You can also try an ice pack for 10 to 15 minutes every 2 to 3 hours. There isn't strong evidence that either heat or ice will help. But you can try them to see if they help you. · Don't spend too long in one position. Take short breaks to move around and change positions. · Wear a seat belt and shoulder harness when you are in a car. · Sleep with a pillow under your head and neck that keeps your neck straight. · If you were given a neck brace (cervical collar) to limit neck motion, wear it as instructed for as many days as your doctor tells you to. Do not wear it longer than you were told to.  Wearing a brace for too long can lead to neck stiffness and can weaken the neck muscles. · Follow your doctor's instructions for gentle neck-stretching exercises. · Do not smoke. Smoking can slow healing of your discs. If you need help quitting, talk to your doctor about stop-smoking programs and medicines. These can increase your chances of quitting for good. · Avoid strenuous work or exercise until your doctor says it is okay. When should you call for help? Call 911 anytime you think you may need emergency care. For example, call if: 
· You are unable to move an arm or a leg at all. Call your doctor now or seek immediate medical care if: 
· You have new or worse symptoms in your arms, legs, chest, belly, or buttocks. Symptoms may include: ¨ Numbness or tingling. ¨ Weakness. ¨ Pain. · You lose bladder or bowel control. Watch closely for changes in your health, and be sure to contact your doctor if: 
· You are not getting better as expected. Where can you learn more? Go to http://mehreen-grzegorz.info/. Enter B092 in the search box to learn more about \"Pinched Nerve in the Neck: Care Instructions. \" Current as of: March 21, 2017 Content Version: 11.3 © 3061-9626 [employer]. Care instructions adapted under license by [employer] (which disclaims liability or warranty for this information). If you have questions about a medical condition or this instruction, always ask your healthcare professional. [place] [address] any warranty or liability for your use of this information. Introducing [facility]! Lena Gutierrez introduces setObject patient portal. Now you can access parts of your medical record, email your doctor's office, and request medication refills online. 1. In your internet browser, go to [email or web address] 2. Click on the First Time User? Click Here link in the Sign In box. You will see the New Member Sign Up page. 3. Enter your Kabbee Access Code exactly as it appears below. You will not need to use this code after youve completed the sign-up process. If you do not sign up before the expiration date, you must request a new code. · Kabbee Access Code: [email or web address] Expires: 11/2/2017 12:20 PM 
 
4. Enter the last four digits of your Social Security Number (xxxx) and Date of Birth (mm/dd/yyyy) as indicated and click Submit. You will be taken to the next sign-up page. 5. Create a Kabbee ID. This will be your Kabbee login ID and cannot be changed, so think of one that is secure and easy to remember. 6. Create a Kabbee password. You can change your password at any time. 7. Enter your Password Reset Question and Answer. This can be used at a later time if you forget your password. 8. Enter your e-mail address. You will receive e-mail notification when new information is available in [address]. 9. Click Sign Up. You can now view and download portions of your medical record. 10. Click the Download Summary menu link to download a portable copy of your medical information. If you have questions, please visit the Frequently Asked Questions section of the Kabbee website. Remember, Kabbee is NOT to be used for urgent needs. For medical emergencies, dial 911. Now available from your iPhone and Android! Please provide this summary of care documentation to your next provider. Your primary care clinician is listed as Patricia Carias. If you have any questions after today's visit, please call [phone].

## 2017-08-04 NOTE — PROGRESS NOTES
1. Have you been to the ER, urgent care clinic since your last visit? Hospitalized since your last visit? No    2. Have you seen or consulted any other health care providers outside of the [address] [place] [address] since your last visit? Include any pap smears or colon screening.  No       Chief Complaint   Patient presents with    Numbness     in both arms- ongoing for years but has gotten worse in the last few weeks

## 2017-08-04 NOTE — PATIENT INSTRUCTIONS
Pinched Nerve in the Neck: Care Instructions  Your Care Instructions  A pinched nerve in the neck happens when a vertebra or disc in the upper part of your spine is damaged. This damage can happen because of an injury. Or it can just happen with age. The changes caused by the damage may put pressure on a nearby nerve root, pinching it. This causes symptoms such as sharp pain in your neck, shoulder, arm, or back. You may also have tingling or numbness. Sometimes it makes your arm weaker. The symptoms are usually worse when you turn your head or strain your neck. For many people, the symptoms get better over time and finally go away. Early treatment usually includes medicines for pain and swelling. Sometimes physical therapy and special exercises may help. Follow-up care is a key part of your treatment and safety. Be sure to make and go to all appointments, and call your doctor if you are having problems. It's also a good idea to know your test results and keep a list of the medicines you take. How can you care for yourself at home? · Be safe with medicines. Read and follow all instructions on the label. ¨ If the doctor gave you a prescription medicine for pain, take it as prescribed. ¨ If you are not taking a prescription pain medicine, ask your doctor if you can take an over-the-counter medicine. · Try using a heating pad on a low or medium setting for 15 to 20 minutes every 2 or 3 hours. Try a warm shower in place of one session with the heating pad. You can also buy single-use heat wraps that last up to 8 hours. · You can also try an ice pack for 10 to 15 minutes every 2 to 3 hours. There isn't strong evidence that either heat or ice will help. But you can try them to see if they help you. · Don't spend too long in one position. Take short breaks to move around and change positions. · Wear a seat belt and shoulder harness when you are in a car.   · Sleep with a pillow under your head and neck that keeps your neck straight. · If you were given a neck brace (cervical collar) to limit neck motion, wear it as instructed for as many days as your doctor tells you to. Do not wear it longer than you were told to. Wearing a brace for too long can lead to neck stiffness and can weaken the neck muscles. · Follow your doctor's instructions for gentle neck-stretching exercises. · Do not smoke. Smoking can slow healing of your discs. If you need help quitting, talk to your doctor about stop-smoking programs and medicines. These can increase your chances of quitting for good. · Avoid strenuous work or exercise until your doctor says it is okay. When should you call for help? Call 911 anytime you think you may need emergency care. For example, call if:  · You are unable to move an arm or a leg at all. Call your doctor now or seek immediate medical care if:  · You have new or worse symptoms in your arms, legs, chest, belly, or buttocks. Symptoms may include:  ¨ Numbness or tingling. ¨ Weakness. ¨ Pain. · You lose bladder or bowel control. Watch closely for changes in your health, and be sure to contact your doctor if:  · You are not getting better as expected. Where can you learn more? Go to http://mehreen-grzegorz.info/. Enter Q788 in the search box to learn more about \"Pinched Nerve in the Neck: Care Instructions. \"  Current as of: March 21, 2017  Content Version: 11.3  © 8970-3420 [employer]. Care instructions adapted under license by [employer] (which disclaims liability or warranty for this information). If you have questions about a medical condition or this instruction, always ask your healthcare professional. [place] [address] any warranty or liability for your use of this information.

## 2017-08-04 NOTE — PROGRESS NOTES
Kong Russo is a 80 y.o. female who was seen in clinic today (8/4/2017). Subjective:  Cardiovascular Review:  The patient in for follow up of hypertension. Taking Losartan-HCTZ 50-12.5 mg 1/2 tablet daily with adequate control. Home readings 130/80s. Diet and Lifestyle: generally follows a low fat low cholesterol diet, generally follows a low sodium diet, sedentary, nonsmoker  Pertinent ROS: taking medications as instructed, no medication side effects noted, no TIA's, no chest pain on exertion, no dyspnea on exertion, no swelling of ankles. Osteopenia  Patient is here for evaluation of osteoporosis. She has not had history of fracture. Repeat bone density deferred by patient. Patient does not exercise daily. Family history is positive for osteoporosis. Patient does not smoke. Patient taking Vitamin D regularly. Osteoarthritis and Chronic Pain:  Patient has osteoarthritis, primarily affecting the neck and back. Symptoms onset: problem is longstanding. Rheumatological ROS:  using Tramadol intermittently. Response to treatment plan: symptoms have progressed to a point and plateaued. Reports new onset of burning and tingling in bilateral arms. Reports diminished  strength at times. Prior to Admission medications    Medication Sig Start Date End Date Taking? Authorizing Provider   pneumococcal 13 junie conj dip (PREVNAR-13) 0.5 mL syrg injection 0.5 mL by IntraMUSCular route once for 1 dose. 8/4/17 8/4/17 Yes Mary Ellen Boyer NP   losartan-hydroCHLOROthiazide (HYZAAR) 50-12.5 mg per tablet Take 0.5 Tabs by mouth daily. For blood pressure 2/28/17  Yes Mary Ellen Boyer NP   traMADol (ULTRAM) 50 mg tablet TAKE 1 TABLET BY MOUTH EVERY 8 HOURS AS NEEDED FOR PAIN 2/10/17  Yes Mary Ellen Boyer NP   Calcium Carbonate-Vit D3-Min 600 mg calcium- 400 unit tab Take 1 Tab by mouth two (2) times a day.  For calcium and vitamin D 10/9/15   Mary Ellen Boyer NP   aspirin (ASPIRIN) 325 mg tablet Take 325 mg by mouth daily. Historical Provider   ibuprofen (ADVIL) 200 mg tablet Take 200 mg by mouth every eight (8) hours as needed for Pain. Historical Provider   travoprost (TRAVATAN Z) 0.004 % ophthalmic solution Administer 1 Drop to both eyes every evening. Historical Provider   dorzolamide (TRUSOPT) 2 % ophthalmic solution Administer 2 Drops to both eyes two (2) times a day. Indications: OPEN ANGLE GLAUCOMA    Historical Provider   ALPHAGAN P 0.1 % Drop Apply 2 Drops to eye two (2) times a day. 2/25/11   Historical Provider          Allergies   Allergen Reactions    Asmanex Twisthaler [Mometasone] Other (comments)     Dizziness and mouth sores    Fosamax [Alendronate] Other (comments)     Leg cramps    Prednisone Anxiety           ROS  See HPI    Objective:   Physical Exam   Constitutional: She is oriented to person, place, and time. She appears well-developed and well-nourished. Neck: Normal range of motion. Neck supple. No JVD present. No spinous process tenderness and no muscular tenderness present. Carotid bruit is not present. Normal range of motion present. No thyromegaly present. Cardiovascular: Normal rate, regular rhythm and intact distal pulses. Exam reveals no gallop and no friction rub. No murmur heard. Pulmonary/Chest: Effort normal and breath sounds normal. No respiratory distress. Musculoskeletal: She exhibits no edema. Lymphadenopathy:     She has no cervical adenopathy. Neurological: She is alert and oriented to person, place, and time. She has normal strength. No sensory deficit. Psychiatric: She has a normal mood and affect. Her behavior is normal.   Nursing note and vitals reviewed.         Visit Vitals    /80 (BP 1 Location: Left arm, BP Patient Position: Sitting)    Pulse 71    Temp 98.2 °F (36.8 °C) (Oral)    Resp 12    Ht 5' 5\" (1.651 m)    Wt 165 lb 6.4 oz (75 kg)    SpO2 93%    BMI 27.52 kg/m2       Assessment & Plan:  Diagnoses and all orders for this visit:    1. Essential hypertension  Well controlled, no medication changes. 2. Vitamin D deficiency  Stable, no changes to current therapy    3. Cervical radiculopathy  X-ray and PT deferred. Referral to pain management for continued symptoms. 4. Encounter for immunization  -     pneumococcal 13 junie conj dip (PREVNAR-13) 0.5 mL syrg injection; 0.5 mL by IntraMUSCular route once for 1 dose. 5. Osteopenia of multiple sites  Repeat DEXA deferred by patient       I have discussed the diagnosis with the patient and the intended plan as seen in the above orders. The patient has received an after-visit summary along with patient information handout. I have discussed medication side effects and warnings with the patient as well. Follow-up Disposition:  Return in about 6 months (around 2/4/2018) for blood pressure.         Ajit Alcaraz NP

## 2017-10-04 ENCOUNTER — OFFICE VISIT (OUTPATIENT)
Dept: FAMILY MEDICINE CLINIC | Age: 82
End: 2017-10-04

## 2017-10-04 VITALS
DIASTOLIC BLOOD PRESSURE: 60 MMHG | BODY MASS INDEX: 27.72 KG/M2 | SYSTOLIC BLOOD PRESSURE: 134 MMHG | TEMPERATURE: 97.5 F | RESPIRATION RATE: 16 BRPM | HEIGHT: 65 IN | OXYGEN SATURATION: 97 % | WEIGHT: 166.4 LBS | HEART RATE: 66 BPM

## 2017-10-04 DIAGNOSIS — I10 ESSENTIAL HYPERTENSION: Primary | ICD-10-CM

## 2017-10-04 DIAGNOSIS — M79.2 NEUROPATHIC PAIN, ARM: ICD-10-CM

## 2017-10-04 DIAGNOSIS — M15.9 PRIMARY OSTEOARTHRITIS INVOLVING MULTIPLE JOINTS: ICD-10-CM

## 2017-10-04 RX ORDER — TRAMADOL HYDROCHLORIDE 50 MG/1
TABLET ORAL
Qty: 60 TAB | Refills: 1 | Status: SHIPPED | OUTPATIENT
Start: 2017-10-04 | End: 2020-02-13

## 2017-10-04 RX ORDER — LOSARTAN POTASSIUM AND HYDROCHLOROTHIAZIDE 12.5; 5 MG/1; MG/1
0.5 TABLET ORAL DAILY
Qty: 30 TAB | Refills: 5 | Status: SHIPPED | OUTPATIENT
Start: 2017-10-04 | End: 2018-11-15 | Stop reason: SDUPTHER

## 2017-10-04 NOTE — PROGRESS NOTES
Identified pt with two pt identifiers(name and ). Reviewed record in preparation for visit and have obtained necessary documentation. Chief Complaint   Patient presents with    Tingling     bilateral arms and fingers with intermittent numbness; \"states it feels like a burning sensation\"    Medication Refill     Losartan and tramadol        Health Maintenance Due   Topic    Pneumococcal 65+ Low/Medium Risk (1 of 2 - PCV13)    INFLUENZA AGE 9 TO ADULT    Already had flu shot at [address]:  :   1) Have you been to an emergency room, urgent care clinic since your last visit? no   Hospitalized since your last visit? no             2. Have seen or consulted any other health care provider since your last visit? YES  If yes, where when, and reason for visit? Eye doctor- Tasha Ugarte 2017      Patient is accompanied by self I have received verbal consent from Sravanthi Castillo to discuss any/all medical information while they are present in the room.

## 2017-10-04 NOTE — PROGRESS NOTES
Levi Key is a 80 y.o. female who was seen in clinic today (10/4/2017). Subjective:  Cardiovascular Review:  The patient in for follow up of hypertension. Taking Losartan-HCTZ 50-12.5 mg 1/2 tablet daily with adequate control. Home readings 130/80s. Diet and Lifestyle: generally follows a low fat low cholesterol diet, generally follows a low sodium diet, sedentary, nonsmoker  Pertinent ROS: taking medications as instructed, no medication side effects noted, no TIA's, no chest pain on exertion, no dyspnea on exertion, no swelling of ankles. Osteoarthritis and Chronic Pain:  Patient has osteoarthritis, primarily affecting the neck and back. Symptoms onset: problem is longstanding. Rheumatological ROS:  using Tramadol intermittently. Response to treatment plan: symptoms have progressed to a point and plateaued. Reports continued burning and tingling in bilateral arms. Reports diminished  strength at times. Prior to Admission medications    Medication Sig Start Date End Date Taking? Authorizing Provider   losartan-hydroCHLOROthiazide (HYZAAR) 50-12.5 mg per tablet Take 0.5 Tabs by mouth daily. For blood pressure 10/4/17  Yes Matilde Montes NP   traMADol (ULTRAM) 50 mg tablet TAKE 1 TABLET BY MOUTH EVERY 8 HOURS AS NEEDED FOR PAIN 10/4/17  Yes Matilde Montes NP   Calcium Carbonate-Vit D3-Min 600 mg calcium- 400 unit tab Take 1 Tab by mouth two (2) times a day. For calcium and vitamin D 10/9/15  Yes Matilde Montes NP   aspirin (ASPIRIN) 325 mg tablet Take 325 mg by mouth daily. Yes Historical Provider   ibuprofen (ADVIL) 200 mg tablet Take 200 mg by mouth every eight (8) hours as needed for Pain. Yes Historical Provider   travoprost (TRAVATAN Z) 0.004 % ophthalmic solution Administer 1 Drop to both eyes every evening. Yes Historical Provider   dorzolamide (TRUSOPT) 2 % ophthalmic solution Administer 2 Drops to both eyes two (2) times a day.  Indications: OPEN ANGLE GLAUCOMA Yes Historical Provider   ALPHAGAN P 0.1 % Drop Apply 2 Drops to eye two (2) times a day. 2/25/11  Yes Historical Provider          Allergies   Allergen Reactions    Asmanex Twisthaler [Mometasone] Other (comments)     Dizziness and mouth sores    Fosamax [Alendronate] Other (comments)     Leg cramps    Prednisone Anxiety           ROS  See HPI    Objective:   Physical Exam   Constitutional: She is oriented to person, place, and time. She appears well-developed and well-nourished. Neck: Normal range of motion. Neck supple. No JVD present. No spinous process tenderness and no muscular tenderness present. Carotid bruit is not present. Normal range of motion present. No thyromegaly present. Cardiovascular: Normal rate, regular rhythm and intact distal pulses. Exam reveals no gallop and no friction rub. No murmur heard. Pulmonary/Chest: Effort normal and breath sounds normal. No respiratory distress. Musculoskeletal: She exhibits no edema. Negative Tinels' bilaterally   Lymphadenopathy:     She has no cervical adenopathy. Neurological: She is alert and oriented to person, place, and time. She has normal strength. No sensory deficit. Psychiatric: She has a normal mood and affect. Her behavior is normal.   Nursing note and vitals reviewed. Visit Vitals    /60 (BP 1 Location: Right arm, BP Patient Position: Sitting)    Pulse 66    Temp 97.5 °F (36.4 °C) (Oral)    Resp 16    Ht 5' 5\" (1.651 m)    Wt 166 lb 6.4 oz (75.5 kg)    SpO2 97%    BMI 27.69 kg/m2       Assessment & Plan:  Diagnoses and all orders for this visit:    1. Essential hypertension  Well controlled, no medication changes. -     losartan-hydroCHLOROthiazide (HYZAAR) 50-12.5 mg per tablet; Take 0.5 Tabs by mouth daily. For blood pressure    2. Primary osteoarthritis involving multiple joints  Stable, no changes to current therapy. Unable to take NSAIDs given CKD.   -     traMADol (ULTRAM) 50 mg tablet; TAKE 1 TABLET BY MOUTH EVERY 8 HOURS AS NEEDED FOR PAIN    3. Neuropathic pain, arm  EMG deferred. Patient requests neurology evaluation.   -     REFERRAL TO NEUROLOGY      I have discussed the diagnosis with the patient and the intended plan as seen in the above orders. The patient has received an after-visit summary along with patient information handout. I have discussed medication side effects and warnings with the patient as well. Follow-up Disposition:  Return if symptoms worsen or fail to improve.         Priya Henriquez, NP

## 2017-10-04 NOTE — PATIENT INSTRUCTIONS
Neuropathic Pain: Care Instructions  Your Care Instructions  Neuropathic pain is caused by pressure on or damage to your nerves. It's often simply called nerve pain. Some people feel this type of pain all the time. For others, it comes and goes. Diabetes, shingles, or an injury can cause nerve pain. Many people say the pain feels sharp, burning, or stabbing. But some people feel it as a dull ache. In some cases, it makes your skin very sensitive. So touch, pressure, and other sensations that did not hurt before may now cause pain. It's important to know that this kind of pain is real and can affect your quality of life. It's also important to know that treatment can help. Treatment includes pain medicines, exercise, and physical therapy. Medicines can help reduce the number of pain signals that travel over the nerves. This can make the painful areas less sensitive. It can also help you sleep better and improve your mood. But medicines are only one part of successful treatment. Most people do best with more than one kind of treatment. Your doctor may recommend that you try cognitive-behavioral therapy and stress management. Or, if needed, you may decide to try to quit smoking, lower your blood pressure, or better control blood sugar. These kinds of healthy changes can also make a difference. If you feel that your treatment is not working, talk to your doctor. And be sure to tell your doctor if you think you might be depressed or anxious. These are common problems that can also be treated. Follow-up care is a key part of your treatment and safety. Be sure to make and go to all appointments, and call your doctor if you are having problems. It's also a good idea to know your test results and keep a list of the medicines you take. How can you care for yourself at home? · Be safe with medicines. Read and follow all instructions on the label.   ¨ If the doctor gave you a prescription medicine for pain, take it as prescribed. ¨ If you are not taking a prescription pain medicine, ask your doctor if you can take an over-the-counter medicine. · Save hard tasks for days when you have less pain. Follow a hard task with an easy task. And remember to take breaks. · Relax, and reduce stress. You may want to try deep breathing or meditation. These can help. · Keep moving. Gentle, daily exercise can help reduce pain. Your doctor or physical therapist can tell you what type of exercise is best for you. This may include walking, swimming, and stationary biking. It may also include stretches and range-of-motion exercises. · Try heat, cold packs, and massage. · Get enough sleep. Constant pain can make you more tired. If the pain makes it hard to sleep, talk with your doctor. · Think positively. Your thoughts can affect your pain. Do fun things to distract yourself from the pain. See a movie, read a book, listen to music, or spend time with a friend. · Keep a pain diary. Try to write down how strong your pain is and what it feels like. Also try to notice and write down how your moods, thoughts, sleep, activities, and medicine affect your pain. These notes can help you and your doctor find the best ways to treat your pain. Reducing constipation caused by pain medicine  Pain medicines often cause constipation. To reduce constipation:  · Include fruits, vegetables, beans, and whole grains in your diet each day. These foods are high in fiber. · Drink plenty of fluids, enough so that your urine is light yellow or clear like water. If you have kidney, heart, or liver disease and have to limit fluids, talk with your doctor before you increase the amount of fluids you drink. · Get some exercise every day. Build up slowly to 30 to 60 minutes a day on 5 or more days of the week. · Take a fiber supplement, such as Citrucel or Metamucil, every day if needed. Read and follow all instructions on the label.   · Schedule time each day for a bowel movement. Having a daily routine may help. Take your time and do not strain when having a bowel movement. · Ask your doctor about a laxative. The goal is to have one easy bowel movement every 1 to 2 days. Do not let constipation go untreated for more than 3 days. When should you call for help? Call your doctor now or seek immediate medical care if:  · You feel sad, anxious, or hopeless for more than a few days. This could mean you are depressed. Depression is common in people who have a lot of pain. But it can be treated. · You have trouble with bowel movements, such as:  ¨ No bowel movement in 3 days. ¨ Blood in the anal area, in your stool, or on the toilet paper. ¨ Diarrhea for more than 24 hours. Watch closely for changes in your health, and be sure to contact your doctor if:  · Your pain is getting worse. · You can't sleep because of pain. · You are very worried or anxious about your pain. · You have trouble taking your pain medicine. · You have any concerns about your pain medicine or its side effects. · You have vomiting or cramps for more than 2 hours. Where can you learn more? Go to http://mehreen-grzegorz.info/. Enter Q629 in the search box to learn more about \"Neuropathic Pain: Care Instructions. \"  Current as of: October 14, 2016  Content Version: 11.3  © 3578-7658 [employer]. Care instructions adapted under license by [employer] (which disclaims liability or warranty for this information). If you have questions about a medical condition or this instruction, always ask your healthcare professional. [place] [address] any warranty or liability for your use of this information.

## 2017-10-04 NOTE — MR AVS SNAPSHOT
Visit Information Date & Time Provider Department Dept. Phone Encounter #  
 10/4/2017 10:45 AM Lora Burkitt,  [facility] [phone] [id number] Follow-up Instructions Return if symptoms worsen or fail to improve. Your Appointments 1/3/2018 10:30 AM  
ROUTINE CARE with Lora Burkitt,  [facility] ([facility]) Appt Note: 6 month fuv, blood pressure [address] [phone]  
[phone]  
  
   
 [address] [phone] Upcoming Health Maintenance Date Due Pneumococcal 65+ Low/Medium Risk (1 of 2 - PCV13) 9/10/1997 MEDICARE YEARLY EXAM 4/27/2018 GLAUCOMA SCREENING Q2Y 8/22/2019 DTaP/Tdap/Td series (2 - Td) 8/4/2027 Allergies as of 10/4/2017  Review Complete On: 56/1/4070 By: Rebeka Navarrete RN Severity Noted Reaction Type Reactions Asmanex Twisthaler [Mometasone]  07/27/2012    Other (comments) Dizziness and mouth sores Fosamax [Alendronate]  05/04/2010    Other (comments) Leg cramps Prednisone  05/17/2011    Anxiety Current Immunizations  Never Reviewed Name Date Influenza High Dose Vaccine PF 10/7/2015 Not reviewed this visit You Were Diagnosed With   
  
 Codes Comments Essential hypertension    -  Primary ICD-10-CM: I10 
ICD-9-CM: 401.9 Primary osteoarthritis involving multiple joints     ICD-10-CM: M15.0 ICD-9-CM: 715.09 Neuropathic pain, arm     ICD-10-CM: G56.90 ICD-9-CM: 723.4 Vitals BP Pulse Temp Resp Height(growth percentile) Weight(growth percentile) 134/60 (BP 1 Location: Right arm, BP Patient Position: Sitting) 66 97.5 °F (36.4 °C) (Oral) 16 5' 5\" (1.651 m) 166 lb 6.4 oz (75.5 kg) SpO2 BMI OB Status Smoking Status 97% 27.69 kg/m2 Postmenopausal Former Smoker BMI and BSA Data  Body Mass Index Body Surface Area  
 27.69 kg/m 2 1.86 m 2  
  
  
 Preferred Pharmacy Pharmacy Name Phone [facility], [address] [place] [address] [phone] Your Updated Medication List  
  
   
This list is accurate as of: 10/4/17 11:21 AM.  Always use your most recent med list. ADVIL 200 mg tablet Generic drug:  ibuprofen Take 200 mg by mouth every eight (8) hours as needed for Pain. ALPHAGAN P 0.1 % ophthalmic solution Generic drug:  brimonidine Apply 2 Drops to eye two (2) times a day. aspirin 325 mg tablet Commonly known as:  ASPIRIN Take 325 mg by mouth daily. Calcium Carbonate-Vit D3-Min 600 mg calcium- 400 unit Tab Take 1 Tab by mouth two (2) times a day. For calcium and vitamin D  
  
 dorzolamide 2 % ophthalmic solution Commonly known as:  TRUSOPT Administer 2 Drops to both eyes two (2) times a day. Indications: OPEN ANGLE GLAUCOMA  
  
 losartan-hydroCHLOROthiazide 50-12.5 mg per tablet Commonly known as:  HYZAAR Take 0.5 Tabs by mouth daily. For blood pressure  
  
 traMADol 50 mg tablet Commonly known as:  ULTRAM  
TAKE 1 TABLET BY MOUTH EVERY 8 HOURS AS NEEDED FOR PAIN  
  
 TRAVATAN Z 0.004 % ophthalmic solution Generic drug:  travoprost  
Administer 1 Drop to both eyes every evening. Prescriptions Printed Refills  
 traMADol (ULTRAM) 50 mg tablet 1 Sig: TAKE 1 TABLET BY MOUTH EVERY 8 HOURS AS NEEDED FOR PAIN Class: Print Prescriptions Sent to Pharmacy Refills  
 losartan-hydroCHLOROthiazide (HYZAAR) 50-12.5 mg per tablet 5 Sig: Take 0.5 Tabs by mouth daily. For blood pressure Class: Normal  
 Pharmacy: [facility] [address], [address] [place] [address] David Curry of [address] [place] [address] #: [phone] Route: Oral  
  
We Performed the Following REFERRAL TO NEUROLOGY [FAT82 Custom] Follow-up Instructions Return if symptoms worsen or fail to improve. Referral Information Referral ID Referred By Referred To  
  
 [id number] [facility] [address] Zuhair Aguilera Visits Status Start Date End Date 1 New Request 10/4/17 10/4/18 If your referral has a status of pending review or denied, additional information will be sent to support the outcome of this decision. Patient Instructions Neuropathic Pain: Care Instructions Your Care Instructions Neuropathic pain is caused by pressure on or damage to your nerves. It's often simply called nerve pain. Some people feel this type of pain all the time. For others, it comes and goes. Diabetes, shingles, or an injury can cause nerve pain. Many people say the pain feels sharp, burning, or stabbing. But some people feel it as a dull ache. In some cases, it makes your skin very sensitive. So touch, pressure, and other sensations that did not hurt before may now cause pain. It's important to know that this kind of pain is real and can affect your quality of life. It's also important to know that treatment can help. Treatment includes pain medicines, exercise, and physical therapy. Medicines can help reduce the number of pain signals that travel over the nerves. This can make the painful areas less sensitive. It can also help you sleep better and improve your mood. But medicines are only one part of successful treatment. Most people do best with more than one kind of treatment. Your doctor may recommend that you try cognitive-behavioral therapy and stress management. Or, if needed, you may decide to try to quit smoking, lower your blood pressure, or better control blood sugar. These kinds of healthy changes can also make a difference. If you feel that your treatment is not working, talk to your doctor.  And be sure to tell your doctor if you think you might be depressed or anxious. These are common problems that can also be treated. Follow-up care is a key part of your treatment and safety. Be sure to make and go to all appointments, and call your doctor if you are having problems. It's also a good idea to know your test results and keep a list of the medicines you take. How can you care for yourself at home? · Be safe with medicines. Read and follow all instructions on the label. ¨ If the doctor gave you a prescription medicine for pain, take it as prescribed. ¨ If you are not taking a prescription pain medicine, ask your doctor if you can take an over-the-counter medicine. · Save hard tasks for days when you have less pain. Follow a hard task with an easy task. And remember to take breaks. · Relax, and reduce stress. You may want to try deep breathing or meditation. These can help. · Keep moving. Gentle, daily exercise can help reduce pain. Your doctor or physical therapist can tell you what type of exercise is best for you. This may include walking, swimming, and stationary biking. It may also include stretches and range-of-motion exercises. · Try heat, cold packs, and massage. · Get enough sleep. Constant pain can make you more tired. If the pain makes it hard to sleep, talk with your doctor. · Think positively. Your thoughts can affect your pain. Do fun things to distract yourself from the pain. See a movie, read a book, listen to music, or spend time with a friend. · Keep a pain diary. Try to write down how strong your pain is and what it feels like. Also try to notice and write down how your moods, thoughts, sleep, activities, and medicine affect your pain. These notes can help you and your doctor find the best ways to treat your pain. Reducing constipation caused by pain medicine Pain medicines often cause constipation. To reduce constipation: 
· Include fruits, vegetables, beans, and whole grains in your diet each day. These foods are high in fiber. · Drink plenty of fluids, enough so that your urine is light yellow or clear like water. If you have kidney, heart, or liver disease and have to limit fluids, talk with your doctor before you increase the amount of fluids you drink. · Get some exercise every day. Build up slowly to 30 to 60 minutes a day on 5 or more days of the week. · Take a fiber supplement, such as Citrucel or Metamucil, every day if needed. Read and follow all instructions on the label. · Schedule time each day for a bowel movement. Having a daily routine may help. Take your time and do not strain when having a bowel movement. · Ask your doctor about a laxative. The goal is to have one easy bowel movement every 1 to 2 days. Do not let constipation go untreated for more than 3 days. When should you call for help? Call your doctor now or seek immediate medical care if: 
· You feel sad, anxious, or hopeless for more than a few days. This could mean you are depressed. Depression is common in people who have a lot of pain. But it can be treated. · You have trouble with bowel movements, such as: 
¨ No bowel movement in 3 days. ¨ Blood in the anal area, in your stool, or on the toilet paper. ¨ Diarrhea for more than 24 hours. Watch closely for changes in your health, and be sure to contact your doctor if: 
· Your pain is getting worse. · You can't sleep because of pain. · You are very worried or anxious about your pain. · You have trouble taking your pain medicine. · You have any concerns about your pain medicine or its side effects. · You have vomiting or cramps for more than 2 hours. Where can you learn more? Go to http://mehreen-grzegorz.info/. Enter Q441 in the search box to learn more about \"Neuropathic Pain: Care Instructions. \" Current as of: October 14, 2016 Content Version: 11.3 © 9231-7800 [employer].  Care instructions adapted under license by [address] (which disclaims liability or warranty for this information). If you have questions about a medical condition or this instruction, always ask your healthcare professional. [address] any warranty or liability for your use of this information. Introducing [facility]! Lishaoracio Menjivar introduces [employer] patient portal. Now you can access parts of your medical record, email your doctor's office, and request medication refills online. 1. In your internet browser, go to [email or web address] 2. Click on the First Time User? Click Here link in the Sign In box. You will see the New Member Sign Up page. 3. Enter your [employer] Access Code exactly as it appears below. You will not need to use this code after youve completed the sign-up process. If you do not sign up before the expiration date, you must request a new code. · [employer] Access Code: [email or web address] Expires: 11/2/2017 12:20 PM 
 
4. Enter the last four digits of your Social Security Number (xxxx) and Date of Birth (mm/dd/yyyy) as indicated and click Submit. You will be taken to the next sign-up page. 5. Create a [employer] ID. This will be your [employer] login ID and cannot be changed, so think of one that is secure and easy to remember. 6. Create a [employer] password. You can change your password at any time. 7. Enter your Password Reset Question and Answer. This can be used at a later time if you forget your password. 8. Enter your e-mail address. You will receive e-mail notification when new information is available in [address] 9. Click Sign Up. You can now view and download portions of your medical record. 10. Click the Download Summary menu link to download a portable copy of your medical information. If you have questions, please visit the Frequently Asked Questions section of the [employer] website.  Remember, [employer] is NOT to be used for urgent needs. For medical emergencies, dial 911. Now available from your iPhone and Android! Please provide this summary of care documentation to your next provider. Your primary care clinician is listed as Laura Miles. If you have any questions after today's visit, please call [phone].

## 2017-11-29 ENCOUNTER — OFFICE VISIT (OUTPATIENT)
Dept: FAMILY MEDICINE CLINIC | Age: 82
End: 2017-11-29

## 2017-11-29 VITALS
HEART RATE: 61 BPM | BODY MASS INDEX: 26.66 KG/M2 | OXYGEN SATURATION: 97 % | HEIGHT: 65 IN | RESPIRATION RATE: 15 BRPM | TEMPERATURE: 97.8 F | DIASTOLIC BLOOD PRESSURE: 82 MMHG | SYSTOLIC BLOOD PRESSURE: 170 MMHG | WEIGHT: 160 LBS

## 2017-11-29 DIAGNOSIS — R20.2 PARESTHESIA OF HAND, BILATERAL: ICD-10-CM

## 2017-11-29 DIAGNOSIS — I44.7 LEFT BUNDLE BRANCH BLOCK: ICD-10-CM

## 2017-11-29 DIAGNOSIS — N18.30 STAGE 3 CHRONIC KIDNEY DISEASE (HCC): ICD-10-CM

## 2017-11-29 DIAGNOSIS — I10 ESSENTIAL HYPERTENSION: Primary | ICD-10-CM

## 2017-11-29 DIAGNOSIS — R06.09 DYSPNEA ON EXERTION: ICD-10-CM

## 2017-11-29 DIAGNOSIS — M15.9 PRIMARY OSTEOARTHRITIS INVOLVING MULTIPLE JOINTS: ICD-10-CM

## 2017-11-29 RX ORDER — BRINZOLAMIDE/BRIMONIDINE TARTRATE 10; 2 MG/ML; MG/ML
SUSPENSION/ DROPS OPHTHALMIC
Refills: 3 | COMMUNITY
Start: 2017-11-02

## 2017-11-29 NOTE — PROGRESS NOTES
Chief Complaint   Patient presents with    Follow-up     disability paperwork     1. Have you been to the ER, urgent care clinic since your last visit? Hospitalized since your last visit? No    2. Have you seen or consulted any other health care providers outside of the [address] [place] [address] since your last visit? Include any pap smears or colon screening.  No

## 2017-11-29 NOTE — PROGRESS NOTES
Abril Mckeon is a 80 y.o. female who was seen in clinic today (11/30/2017). Subjective:  Cardiovascular Review:  The patient in for follow up of hypertension. Taking Losartan-HCTZ 50-12.5 mg - 1/2 tablet daily with adequate control. Home readings: 150/80s  Diet and Lifestyle: generally follows a low fat low cholesterol diet, generally follows a low sodium diet, sedentary, nonsmoker  Pertinent ROS: taking medications as instructed, no medication side effects noted, no TIA's, no chest pain on exertion, no swelling of ankles. Patient reports new onset of dyspnea on exertion. Reports symptoms worsening with going up stairs. Last cardiac evaluation in 2011 was negative other then left bundle branch block. Denies chest pain, weight gain or edema. Osteoarthritis and Chronic Pain:  Patient has osteoarthritis, primarily affecting the neck and back. Symptoms onset: problem is longstanding. Rheumatological ROS:  Using Aspirin daily and  Tramadol intermittently. Response to treatment plan: symptoms have progressed to a point and plateaued. Reports continued burning and tingling in bilateral arms. Reports diminished  strength at times. Seen by neurology, Dr. Jed Montiel and advised that she had carpal tunnel syndrome. Prior to Admission medications    Medication Sig Start Date End Date Taking? Authorizing Provider   SIMBRINZA 1-0.2 % drps SHAKE LQ AND INT 1 GTT IN OU TID 11/2/17  Yes Historical Provider   losartan-hydroCHLOROthiazide (HYZAAR) 50-12.5 mg per tablet Take 0.5 Tabs by mouth daily. For blood pressure 10/4/17  Yes Yeison Pisano NP   traMADol (ULTRAM) 50 mg tablet TAKE 1 TABLET BY MOUTH EVERY 8 HOURS AS NEEDED FOR PAIN 10/4/17  Yes Yeison Pisano NP   Calcium Carbonate-Vit D3-Min 600 mg calcium- 400 unit tab Take 1 Tab by mouth two (2) times a day. For calcium and vitamin D 10/9/15  Yes Yeison Pisano NP   aspirin (ASPIRIN) 325 mg tablet Take 325 mg by mouth daily.    Yes Historical Provider   ibuprofen (ADVIL) 200 mg tablet Take 200 mg by mouth every eight (8) hours as needed for Pain. Yes Historical Provider   travoprost (TRAVATAN Z) 0.004 % ophthalmic solution Administer 1 Drop to both eyes every evening. Yes Historical Provider   ALPHAGAN P 0.1 % Drop Apply 2 Drops to eye two (2) times a day. 2/25/11  Yes Historical Provider   dorzolamide (TRUSOPT) 2 % ophthalmic solution Administer 2 Drops to both eyes two (2) times a day. Indications: OPEN ANGLE GLAUCOMA    Historical Provider          Allergies   Allergen Reactions    Asmanex Twisthaler [Mometasone] Other (comments)     Dizziness and mouth sores    Fosamax [Alendronate] Other (comments)     Leg cramps    Prednisone Anxiety           ROS  See HPI    Objective:   Physical Exam   Constitutional: She is oriented to person, place, and time. She appears well-developed and well-nourished. Neck: Normal range of motion. Neck supple. No JVD present. Carotid bruit is not present. No thyromegaly present. Cardiovascular: Normal rate, regular rhythm and intact distal pulses. Exam reveals no gallop and no friction rub. No murmur heard. Pulmonary/Chest: Effort normal and breath sounds normal. No respiratory distress. Musculoskeletal: She exhibits no edema. Lymphadenopathy:     She has no cervical adenopathy. Neurological: She is alert and oriented to person, place, and time. Psychiatric: She has a normal mood and affect. Her behavior is normal.   Nursing note and vitals reviewed. Visit Vitals    /82 (BP 1 Location: Right arm, BP Patient Position: Sitting)    Pulse 61    Temp 97.8 °F (36.6 °C) (Oral)    Resp 15    Ht 5' 5\" (1.651 m)    Wt 160 lb (72.6 kg)    SpO2 97%    BMI 26.63 kg/m2       Assessment & Plan:  Diagnoses and all orders for this visit:    1. Essential hypertension  Not to goal. Increase Losartan-HCTZ to whole tablet.  Continue home monitoring and call for reading >140/90  - REFERRAL TO CARDIOLOGY    2. Dyspnea on exertion  EKG deferred by patient, would prefer to see cardiology. Cardiac evaluation needed. Go to ER for new or worsening symptoms  -     REFERRAL TO CARDIOLOGY    3. Primary osteoarthritis involving multiple joints  Tramadol and Tylenol PRN. 4. Stage 3 chronic kidney disease  Stable, no changes to current therapy    5. Left bundle branch block  Cardiac evaluation as above. 6. Paresthesia of hand, bilateral  Advised patient to follow up with orthopedics. 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 I have discussed the diagnosis with the patient and the intended plan as seen in the above orders. The patient has received an after-visit summary along with patient information handout. I have discussed medication side effects and warnings with the patient as well. Follow-up Disposition:  Return in about 4 weeks (around 12/27/2017) for blood pressure.         Kristy Kiran NP

## 2017-11-29 NOTE — MR AVS SNAPSHOT
Visit Information Date & Time Provider Department Dept. Phone Encounter #  
 11/29/2017 12:15 PM Governor Hansa  [facility] [phone] [id number] Follow-up Instructions Return in about 4 weeks (around 12/27/2017) for blood pressure. Your Appointments 1/3/2018 10:30 AM  
ROUTINE CARE with Governor Hansa  [address] ([address]) Appt Note: 6 month fuv, blood pressure [phone]  
[phone]  
  
   
 [address] [phone] Upcoming Health Maintenance Date Due Pneumococcal 65+ Low/Medium Risk (1 of 2 - PCV13) 9/10/1997 MEDICARE YEARLY EXAM 4/27/2018 GLAUCOMA SCREENING Q2Y 8/22/2019 DTaP/Tdap/Td series (2 - Td) 8/4/2027 Allergies as of 11/29/2017  Review Complete On: 11/29/2017 By: Mary Auguste LPN Severity Noted Reaction Type Reactions Asmanex Twisthaler [Mometasone]  07/27/2012    Other (comments) Dizziness and mouth sores Fosamax [Alendronate]  05/04/2010    Other (comments) Leg cramps Prednisone  05/17/2011    Anxiety Current Immunizations  Never Reviewed Name Date Influenza High Dose Vaccine PF 10/7/2015 Not reviewed this visit You Were Diagnosed With   
  
 Codes Comments Essential hypertension    -  Primary ICD-10-CM: I10 
ICD-9-CM: 401.9 Dyspnea on exertion     ICD-10-CM: R06.09 
ICD-9-CM: 786.09 Primary osteoarthritis involving multiple joints     ICD-10-CM: M15.0 ICD-9-CM: 715.09 Stage 3 chronic kidney disease     ICD-10-CM: N18.3 ICD-9-CM: 791. 3 Left bundle branch block     ICD-10-CM: I44.7 ICD-9-CM: 426.3 Paresthesia of hand, bilateral     ICD-10-CM: R20.2 ICD-9-CM: 101. 0 Vitals BP Pulse Temp Resp Height(growth percentile) Weight(growth percentile)  170/82 (BP 1 Location: Right arm, BP Patient Position: Sitting) 61 97.8 °F (36.6 °C) (Oral) 15 5' 5\" (1.651 m) 160 lb (72.6 kg) SpO2 BMI OB Status Smoking Status 97% 26.63 kg/m2 Postmenopausal Former Smoker Vitals History BMI and BSA Data Body Mass Index Body Surface Area  
 26.63 kg/m 2 1.82 m 2 Preferred Pharmacy Pharmacy Name Phone [facility] [address] [place] [address], [facility] [phone] Your Updated Medication List  
  
   
This list is accurate as of: 11/29/17  1:14 PM.  Always use your most recent med list. ADVIL 200 mg tablet Generic drug:  ibuprofen Take 200 mg by mouth every eight (8) hours as needed for Pain. ALPHAGAN P 0.1 % ophthalmic solution Generic drug:  brimonidine Apply 2 Drops to eye two (2) times a day. aspirin 325 mg tablet Commonly known as:  ASPIRIN Take 325 mg by mouth daily. Calcium Carbonate-Vit D3-Min 600 mg calcium- 400 unit Tab Take 1 Tab by mouth two (2) times a day. For calcium and vitamin D  
  
 dorzolamide 2 % ophthalmic solution Commonly known as:  TRUSOPT Administer 2 Drops to both eyes two (2) times a day. Indications: OPEN ANGLE GLAUCOMA  
  
 losartan-hydroCHLOROthiazide 50-12.5 mg per tablet Commonly known as:  HYZAAR Take 0.5 Tabs by mouth daily. For blood pressure SIMBRINZA 1-0.2 % Drps Generic drug:  brinzolamide-brimonidine SHAKE LQ AND INT 1 GTT IN OU TID  
  
 traMADol 50 mg tablet Commonly known as:  ULTRAM  
TAKE 1 TABLET BY MOUTH EVERY 8 HOURS AS NEEDED FOR PAIN  
  
 TRAVATAN Z 0.004 % ophthalmic solution Generic drug:  travoprost  
Administer 1 Drop to both eyes every evening. We Performed the Following AMB POC EKG ROUTINE W/ 12 LEADS, INTER & REP [35236 CPT(R)] REFERRAL TO CARDIOLOGY [PYG08 Custom] Follow-up Instructions Return in about 4 weeks (around 12/27/2017) for blood pressure. Referral Information Referral ID Referred By Referred To  
  
 [id number] Ximena Mendoza MD   
   
 Visits Status Start Date End Date 1 New Request 11/29/17 11/29/18 If your referral has a status of pending review or denied, additional information will be sent to support the outcome of this decision. Patient Instructions High Blood Pressure: Care Instructions Your Care Instructions If your blood pressure is usually above 140/90, you have high blood pressure, or hypertension. That means the top number is 140 or higher or the bottom number is 90 or higher, or both. Despite what a lot of people think, high blood pressure usually doesn't cause headaches or make you feel dizzy or lightheaded. It usually has no symptoms. But it does increase your risk for heart attack, stroke, and kidney or eye damage. The higher your blood pressure, the more your risk increases. Your doctor will give you a goal for your blood pressure. Your goal will be based on your health and your age. An example of a goal is to keep your blood pressure below 140/90. Lifestyle changes, such as eating healthy and being active, are always important to help lower blood pressure. You might also take medicine to reach your blood pressure goal. 
Follow-up care is a key part of your treatment and safety. Be sure to make and go to all appointments, and call your doctor if you are having problems. It's also a good idea to know your test results and keep a list of the medicines you take. How can you care for yourself at home? Medical treatment · If you stop taking your medicine, your blood pressure will go back up. You may take one or more types of medicine to lower your blood pressure. Be safe with medicines. Take your medicine exactly as prescribed. Call your doctor if you think you are having a problem with your medicine. · Talk to your doctor before you start taking aspirin every day.  Aspirin can help certain people lower their risk of a heart attack or stroke. But taking aspirin isn't right for everyone, because it can cause serious bleeding. · See your doctor regularly. You may need to see the doctor more often at first or until your blood pressure comes down. · If you are taking blood pressure medicine, talk to your doctor before you take decongestants or anti-inflammatory medicine, such as ibuprofen. Some of these medicines can raise blood pressure. · Learn how to check your blood pressure at home. Lifestyle changes · Stay at a healthy weight. This is especially important if you put on weight around the waist. Losing even 10 pounds can help you lower your blood pressure. · If your doctor recommends it, get more exercise. Walking is a good choice. Bit by bit, increase the amount you walk every day. Try for at least 30 minutes on most days of the week. You also may want to swim, bike, or do other activities. · Avoid or limit alcohol. Talk to your doctor about whether you can drink any alcohol. · Try to limit how much sodium you eat to less than 2,300 milligrams (mg) a day. Your doctor may ask you to try to eat less than 1,500 mg a day. · Eat plenty of fruits (such as bananas and oranges), vegetables, legumes, whole grains, and low-fat dairy products. · Lower the amount of saturated fat in your diet. Saturated fat is found in animal products such as milk, cheese, and meat. Limiting these foods may help you lose weight and also lower your risk for heart disease. · Do not smoke. Smoking increases your risk for heart attack and stroke. If you need help quitting, talk to your doctor about stop-smoking programs and medicines. These can increase your chances of quitting for good. When should you call for help? Call 911 anytime you think you may need emergency care.  This may mean having symptoms that suggest that your blood pressure is causing a serious heart or blood vessel problem. Your blood pressure may be over 180/110. ? For example, call 911 if: 
? · You have symptoms of a heart attack. These may include: ¨ Chest pain or pressure, or a strange feeling in the chest. 
¨ Sweating. ¨ Shortness of breath. ¨ Nausea or vomiting. ¨ Pain, pressure, or a strange feeling in the back, neck, jaw, or upper belly or in one or both shoulders or arms. ¨ Lightheadedness or sudden weakness. ¨ A fast or irregular heartbeat. ? · You have symptoms of a stroke. These may include: 
¨ Sudden numbness, tingling, weakness, or loss of movement in your face, arm, or leg, especially on only one side of your body. ¨ Sudden vision changes. ¨ Sudden trouble speaking. ¨ Sudden confusion or trouble understanding simple statements. ¨ Sudden problems with walking or balance. ¨ A sudden, severe headache that is different from past headaches. ? · You have severe back or belly pain. ?Do not wait until your blood pressure comes down on its own. Get help right away. ?Call your doctor now or seek immediate care if: 
? · Your blood pressure is much higher than normal (such as 180/110 or higher), but you don't have symptoms. ? · You think high blood pressure is causing symptoms, such as: ¨ Severe headache. ¨ Blurry vision. ? Watch closely for changes in your health, and be sure to contact your doctor if: 
? · Your blood pressure measures 140/90 or higher at least 2 times. That means the top number is 140 or higher or the bottom number is 90 or higher, or both. ? · You think you may be having side effects from your blood pressure medicine. ? · Your blood pressure is usually normal, but it goes above normal at least 2 times. Where can you learn more? Go to http://mehreen-grzegorz.info/. Enter P201 in the search box to learn more about \"High Blood Pressure: Care Instructions. \" Current as of: September 21, 2016 Content Version: 11.4 © 4431-9547 Healthwise, Incorporated. Care instructions adapted under license by [employer] (which disclaims liability or warranty for this information). If you have questions about a medical condition or this instruction, always ask your healthcare professional. [address] any warranty or liability for your use of this information. Introducing [facility]! [facility] introduces Mabaya patient portal. Now you can access parts of your medical record, email your doctor's office, and request medication refills online. 1. In your internet browser, go to [email or web address] 2. Click on the First Time User? Click Here link in the Sign In box. You will see the New Member Sign Up page. 3. Enter your Mabaya Access Code exactly as it appears below. You will not need to use this code after youve completed the sign-up process. If you do not sign up before the expiration date, you must request a new code. · Mabaya Access Code: [email or web address] Expires: 2/27/2018  1:14 PM 
 
4. Enter the last four digits of your Social Security Number (xxxx) and Date of Birth (mm/dd/yyyy) as indicated and click Submit. You will be taken to the next sign-up page. 5. Create a Mabaya ID. This will be your Mabaya login ID and cannot be changed, so think of one that is secure and easy to remember. 6. Create a Mabaya password. You can change your password at any time. 7. Enter your Password Reset Question and Answer. This can be used at a later time if you forget your password. 8. Enter your e-mail address. You will receive e-mail notification when new information is available in [address] 9. Click Sign Up. You can now view and download portions of your medical record. 10. Click the Download Summary menu link to download a portable copy of your medical information.  
 
If you have questions, please visit the Frequently Asked Questions section of the [employer]. Remember, [employer] is NOT to be used for urgent needs. For medical emergencies, dial 911. Now available from your iPhone and Android! Please provide this summary of care documentation to your next provider. Your primary care clinician is listed as [place]. If you have any questions after today's visit, please call [phone].

## 2017-11-29 NOTE — PATIENT INSTRUCTIONS
High Blood Pressure: Care Instructions  Your Care Instructions    If your blood pressure is usually above 140/90, you have high blood pressure, or hypertension. That means the top number is 140 or higher or the bottom number is 90 or higher, or both. Despite what a lot of people think, high blood pressure usually doesn't cause headaches or make you feel dizzy or lightheaded. It usually has no symptoms. But it does increase your risk for heart attack, stroke, and kidney or eye damage. The higher your blood pressure, the more your risk increases. Your doctor will give you a goal for your blood pressure. Your goal will be based on your health and your age. An example of a goal is to keep your blood pressure below 140/90. Lifestyle changes, such as eating healthy and being active, are always important to help lower blood pressure. You might also take medicine to reach your blood pressure goal.  Follow-up care is a key part of your treatment and safety. Be sure to make and go to all appointments, and call your doctor if you are having problems. It's also a good idea to know your test results and keep a list of the medicines you take. How can you care for yourself at home? Medical treatment  · If you stop taking your medicine, your blood pressure will go back up. You may take one or more types of medicine to lower your blood pressure. Be safe with medicines. Take your medicine exactly as prescribed. Call your doctor if you think you are having a problem with your medicine. · Talk to your doctor before you start taking aspirin every day. Aspirin can help certain people lower their risk of a heart attack or stroke. But taking aspirin isn't right for everyone, because it can cause serious bleeding. · See your doctor regularly. You may need to see the doctor more often at first or until your blood pressure comes down.   · If you are taking blood pressure medicine, talk to your doctor before you take decongestants or anti-inflammatory medicine, such as ibuprofen. Some of these medicines can raise blood pressure. · Learn how to check your blood pressure at home. Lifestyle changes  · Stay at a healthy weight. This is especially important if you put on weight around the waist. Losing even 10 pounds can help you lower your blood pressure. · If your doctor recommends it, get more exercise. Walking is a good choice. Bit by bit, increase the amount you walk every day. Try for at least 30 minutes on most days of the week. You also may want to swim, bike, or do other activities. · Avoid or limit alcohol. Talk to your doctor about whether you can drink any alcohol. · Try to limit how much sodium you eat to less than 2,300 milligrams (mg) a day. Your doctor may ask you to try to eat less than 1,500 mg a day. · Eat plenty of fruits (such as bananas and oranges), vegetables, legumes, whole grains, and low-fat dairy products. · Lower the amount of saturated fat in your diet. Saturated fat is found in animal products such as milk, cheese, and meat. Limiting these foods may help you lose weight and also lower your risk for heart disease. · Do not smoke. Smoking increases your risk for heart attack and stroke. If you need help quitting, talk to your doctor about stop-smoking programs and medicines. These can increase your chances of quitting for good. When should you call for help? Call 911 anytime you think you may need emergency care. This may mean having symptoms that suggest that your blood pressure is causing a serious heart or blood vessel problem. Your blood pressure may be over 180/110. ? For example, call 911 if:  ? · You have symptoms of a heart attack. These may include:  ¨ Chest pain or pressure, or a strange feeling in the chest.  ¨ Sweating. ¨ Shortness of breath. ¨ Nausea or vomiting.   ¨ Pain, pressure, or a strange feeling in the back, neck, jaw, or upper belly or in one or both shoulders or arms.  ¨ Lightheadedness or sudden weakness. ¨ A fast or irregular heartbeat. ? · You have symptoms of a stroke. These may include:  ¨ Sudden numbness, tingling, weakness, or loss of movement in your face, arm, or leg, especially on only one side of your body. ¨ Sudden vision changes. ¨ Sudden trouble speaking. ¨ Sudden confusion or trouble understanding simple statements. ¨ Sudden problems with walking or balance. ¨ A sudden, severe headache that is different from past headaches. ? · You have severe back or belly pain. ?Do not wait until your blood pressure comes down on its own. Get help right away. ?Call your doctor now or seek immediate care if:  ? · Your blood pressure is much higher than normal (such as 180/110 or higher), but you don't have symptoms. ? · You think high blood pressure is causing symptoms, such as:  ¨ Severe headache. ¨ Blurry vision. ? Watch closely for changes in your health, and be sure to contact your doctor if:  ? · Your blood pressure measures 140/90 or higher at least 2 times. That means the top number is 140 or higher or the bottom number is 90 or higher, or both. ? · You think you may be having side effects from your blood pressure medicine. ? · Your blood pressure is usually normal, but it goes above normal at least 2 times. Where can you learn more? Go to http://mehreen-grzegorz.info/. Enter U866 in the search box to learn more about \"High Blood Pressure: Care Instructions. \"  Current as of: September 21, 2016  Content Version: 11.4  © 9974-7209 [employer]. Care instructions adapted under license by [employer] (which disclaims liability or warranty for this information). If you have questions about a medical condition or this instruction, always ask your healthcare professional. [address] any warranty or liability for your use of this information.

## 2017-11-30 NOTE — ACP (ADVANCE CARE PLANNING)
Advance Care Planning (ACP) Provider Conversation Snapshot    Date of ACP Conversation: 11/30/17  Persons included in Conversation:  patient  Length of ACP Conversation in minutes:  <16 minutes (Non-Billable)    Authorized Decision Maker (if patient is incapable of making informed decisions):    This person is:   Healthcare Agent/Medical Power of  under Advance Directive    Conversation Outcomes / Follow-Up Plan:   Recommended communicating the plan and making copies for the healthcare agent, personal physician, and others as appropriate (e.g., health system)

## 2018-01-03 ENCOUNTER — HOSPITAL ENCOUNTER (OUTPATIENT)
Dept: LAB | Age: 83
Discharge: HOME | End: 2018-01-03
Payer: MEDICARE

## 2018-01-03 ENCOUNTER — OFFICE VISIT (OUTPATIENT)
Dept: FAMILY MEDICINE CLINIC | Age: 83
End: 2018-01-03

## 2018-01-03 VITALS
OXYGEN SATURATION: 96 % | WEIGHT: 166 LBS | DIASTOLIC BLOOD PRESSURE: 80 MMHG | HEIGHT: 65 IN | RESPIRATION RATE: 16 BRPM | BODY MASS INDEX: 27.66 KG/M2 | TEMPERATURE: 98.6 F | HEART RATE: 73 BPM | SYSTOLIC BLOOD PRESSURE: 132 MMHG

## 2018-01-03 DIAGNOSIS — E55.9 VITAMIN D DEFICIENCY: ICD-10-CM

## 2018-01-03 DIAGNOSIS — R20.2 PARESTHESIA OF BOTH HANDS: ICD-10-CM

## 2018-01-03 DIAGNOSIS — I10 ESSENTIAL HYPERTENSION: Primary | ICD-10-CM

## 2018-01-03 DIAGNOSIS — E78.2 MIXED HYPERLIPIDEMIA: ICD-10-CM

## 2018-01-03 DIAGNOSIS — N18.30 STAGE 3 CHRONIC KIDNEY DISEASE (HCC): ICD-10-CM

## 2018-01-03 PROCEDURE — 82306 VITAMIN D 25 HYDROXY: CPT

## 2018-01-03 PROCEDURE — 80053 COMPREHEN METABOLIC PANEL: CPT

## 2018-01-03 PROCEDURE — 85651 RBC SED RATE NONAUTOMATED: CPT

## 2018-01-03 PROCEDURE — 80061 LIPID PANEL: CPT

## 2018-01-03 PROCEDURE — 86140 C-REACTIVE PROTEIN: CPT

## 2018-01-03 PROCEDURE — 82607 VITAMIN B-12: CPT

## 2018-01-03 PROCEDURE — 85027 COMPLETE CBC AUTOMATED: CPT

## 2018-01-03 RX ORDER — CHOLECALCIFEROL (VITAMIN D3) 125 MCG
CAPSULE ORAL
COMMUNITY

## 2018-01-03 NOTE — PATIENT INSTRUCTIONS
Numbness and Tingling: Care Instructions  Your Care Instructions    Many things can cause numbness or tingling. Swelling may put pressure on a nerve. This could cause you to lose feeling or have a pins-and-needles sensation on part of your body. Nerves may be damaged from trauma, toxins, or diseases, such as diabetes or multiple sclerosis (MS). Sometimes, though, the cause is not clear. If there is no clear reason for your symptoms, and you are not having any other symptoms, your doctor may suggest watching and waiting for a while to see if the numbness or tingling goes away on its own. Your doctor may want you to have blood or nerve tests to find the cause of your symptoms. Follow-up care is a key part of your treatment and safety. Be sure to make and go to all appointments, and call your doctor if you are having problems. It's also a good idea to know your test results and keep a list of the medicines you take. How can you care for yourself at home? · If your doctor prescribes medicine, take it exactly as directed. Call your doctor if you think you are having a problem with your medicine. · If you have any swelling, put ice or a cold pack on the area for 10 to 20 minutes at a time. Put a thin cloth between the ice and your skin. When should you call for help? Call 911 anytime you think you may need emergency care. For example, call if:  ? · You have weakness, numbness, or tingling in both legs. ? · You lose bowel or bladder control. ? · You have symptoms of a stroke. These may include:  ¨ Sudden numbness, tingling, weakness, or loss of movement in your face, arm, or leg, especially on only one side of your body. ¨ Sudden vision changes. ¨ Sudden trouble speaking. ¨ Sudden confusion or trouble understanding simple statements. ¨ Sudden problems with walking or balance. ¨ A sudden, severe headache that is different from past headaches. ? Watch closely for changes in your health, and be sure to contact your doctor if you have any problems, or if:  ? · You do not get better as expected. Where can you learn more? Go to http://mehreen-grzegorz.info/. Enter Y411 in the search box to learn more about \"Numbness and Tingling: Care Instructions. \"  Current as of: October 14, 2016  Content Version: 11.4  © 4581-2021 [employer]. Care instructions adapted under license by [employer] (which disclaims liability or warranty for this information). If you have questions about a medical condition or this instruction, always ask your healthcare professional. [place] [address] any warranty or liability for your use of this information.

## 2018-01-03 NOTE — PROGRESS NOTES
Chief Complaint   Patient presents with    Hypertension     Follow up     1. Have you been to the ER, urgent care clinic since your last visit? Hospitalized since your last visit? No    2. Have you seen or consulted any other health care providers outside of the [place] [address] since your last visit? Include any pap smears or colon screening.  No

## 2018-01-03 NOTE — PROGRESS NOTES
Irma Oliveros is a 80 y.o. female who was seen in clinic today (1/3/2018). Subjective:  Cardiovascular Review:  The patient in for follow up of hypertension. Taking Losartan-HCTZ 50-12.5 mg - 1/2 tablet daily with adequate control. Home readings: 140/80s  Diet and Lifestyle: generally follows a low fat low cholesterol diet, generally follows a low sodium diet, sedentary, nonsmoker  Pertinent ROS: taking medications as instructed, no medication side effects noted, no TIA's, no chest pain on exertion, no swelling of ankles. Patient reports continued dyspnea on exertion. Reports symptoms worsening with going up stairs. Last cardiac evaluation in 2011 was negative other then left bundle branch block. Denies chest pain, weight gain or edema. Patient scheduled to follow up with Dr. Radha Nicolas next week. Osteoarthritis and Chronic Pain:  Patient has osteoarthritis, primarily affecting the neck and back. Symptoms onset: problem is longstanding. Rheumatological ROS:  Using Aspirin daily and  Tramadol intermittently. Response to treatment plan: symptoms have progressed to a point and plateaued. Reports continued burning and tingling in bilateral arms. Reports diminished  strength at times. Seen by neurology, Dr. Ileana Hodgkins and advised that she had carpal tunnel syndrome. Patient has not followed up with orthopedics. Prior to Admission medications    Medication Sig Start Date End Date Taking? Authorizing Provider   cholecalciferol, vitamin D3, (VITAMIN D3) 2,000 unit tab Take  by mouth. Yes Historical Provider   SIMBRINZA 1-0.2 % drps SHAKE LQ AND INT 1 GTT IN OU TID 11/2/17  Yes Historical Provider   losartan-hydroCHLOROthiazide (HYZAAR) 50-12.5 mg per tablet Take 0.5 Tabs by mouth daily.  For blood pressure 10/4/17  Yes Sebas Brooks NP   traMADol (ULTRAM) 50 mg tablet TAKE 1 TABLET BY MOUTH EVERY 8 HOURS AS NEEDED FOR PAIN 10/4/17  Yes Sebas Brooks NP   aspirin (ASPIRIN) 325 mg tablet Take 325 mg by mouth daily. Yes Historical Provider   ibuprofen (ADVIL) 200 mg tablet Take 200 mg by mouth every eight (8) hours as needed for Pain. Yes Historical Provider   travoprost (TRAVATAN Z) 0.004 % ophthalmic solution Administer 1 Drop to both eyes every evening. Yes Historical Provider   Calcium Carbonate-Vit D3-Min 600 mg calcium- 400 unit tab Take 1 Tab by mouth two (2) times a day. For calcium and vitamin D 10/9/15   Selena Beaulieu NP   dorzolamide (TRUSOPT) 2 % ophthalmic solution Administer 2 Drops to both eyes two (2) times a day. Indications: OPEN ANGLE GLAUCOMA    Historical Provider   ALPHAGAN P 0.1 % Drop Apply 2 Drops to eye two (2) times a day. 2/25/11   Historical Provider          Allergies   Allergen Reactions    Asmanex Twisthaler [Mometasone] Other (comments)     Dizziness and mouth sores    Fosamax [Alendronate] Other (comments)     Leg cramps    Prednisone Anxiety           ROS  See HPI    Objective:   Physical Exam   Constitutional: She is oriented to person, place, and time. She appears well-developed and well-nourished. Neck: Normal range of motion. Neck supple. No JVD present. Carotid bruit is not present. No thyromegaly present. Cardiovascular: Normal rate, regular rhythm and intact distal pulses. Exam reveals no gallop and no friction rub. No murmur heard. Pulmonary/Chest: Effort normal and breath sounds normal. No respiratory distress. Musculoskeletal: She exhibits no edema. Lymphadenopathy:     She has no cervical adenopathy. Neurological: She is alert and oriented to person, place, and time. Psychiatric: She has a normal mood and affect. Her behavior is normal.   Nursing note and vitals reviewed.         Visit Vitals    /80 (BP 1 Location: Left arm, BP Patient Position: Sitting)  Comment: manual    Pulse 73    Temp 98.6 °F (37 °C) (Oral)    Resp 16    Ht 5' 5\" (1.651 m)    Wt 166 lb (75.3 kg)    SpO2 96%    BMI 27.62 kg/m2 Assessment & Plan:  Diagnoses and all orders for this visit:    1. Essential hypertension  Well controlled, no medication changes. -     CBC W/O DIFF  -     METABOLIC PANEL, COMPREHENSIVE    2. 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 Will request orthopedic evaluation.   -     REFERRAL TO ORTHOPEDIC SURGERY  -     VITAMIN B12  -     SED RATE (ESR)  -     C REACTIVE PROTEIN, QT    3. Stage 3 chronic kidney disease  Stable, no changes to current therapy    4. Mixed hyperlipidemia  -     LIPID PANEL    5. Vitamin D deficiency  -     VITAMIN D, 25 HYDROXY      I have discussed the diagnosis with the patient and the intended plan as seen in the above orders. The patient has received an after-visit summary along with patient information handout. I have discussed medication side effects and warnings with the patient as well. Follow-up Disposition:  Return in about 6 months (around 7/3/2018) for blood pressure.         Yuridia Ren NP

## 2018-01-03 NOTE — MR AVS SNAPSHOT
Visit Information Date & Time Provider Department Dept. Phone Encounter #  
 1/3/2018 10:30 AM Annette Wilder  [facility] [phone] [id number] Follow-up Instructions Return in about 6 months (around 7/3/2018) for blood pressure. Upcoming Health Maintenance Date Due Pneumococcal 65+ Low/Medium Risk (1 of 2 - PCV13) 9/10/1997 MEDICARE YEARLY EXAM 4/27/2018 GLAUCOMA SCREENING Q2Y 8/22/2019 DTaP/Tdap/Td series (2 - Td) 8/4/2027 Allergies as of 1/3/2018  Review Complete On: 1/3/2018 By: Jericho Adan LPN Severity Noted Reaction Type Reactions Asmanex Twisthaler [Mometasone]  07/27/2012    Other (comments) Dizziness and mouth sores Fosamax [Alendronate]  05/04/2010    Other (comments) Leg cramps Prednisone  05/17/2011    Anxiety Current Immunizations  Never Reviewed Name Date Influenza High Dose Vaccine PF 10/7/2015 Not reviewed this visit You Were Diagnosed With   
  
 Codes Comments Essential hypertension    -  Primary ICD-10-CM: I10 
ICD-9-CM: 401.9 Paresthesia of both hands     ICD-10-CM: R20.2 ICD-9-CM: 782.0 Stage 3 chronic kidney disease     ICD-10-CM: N18.3 ICD-9-CM: 767. 3 Mixed hyperlipidemia     ICD-10-CM: E78.2 ICD-9-CM: 272.2 Vitamin D deficiency     ICD-10-CM: E55.9 ICD-9-CM: 268.9 Vitals BP Pulse Temp Resp Height(growth percentile) Weight(growth percentile) 132/80 (BP 1 Location: Left arm, BP Patient Position: Sitting) 73 98.6 °F (37 °C) (Oral) 16 5' 5\" (1.651 m) 166 lb (75.3 kg) SpO2 BMI OB Status Smoking Status 96% 27.62 kg/m2 Postmenopausal Former Smoker Vitals History BMI and BSA Data Body Mass Index Body Surface Area  
 27.62 kg/m 2 1.86 m 2 Preferred Pharmacy Pharmacy Name Phone [facility] [address] [place] [address], [address] [place] [address] [phone] Your Updated Medication List  
  
   
This list is accurate as of: 1/3/18 10:53 AM.  Always use your most recent med list. ADVIL 200 mg tablet Generic drug:  ibuprofen Take 200 mg by mouth every eight (8) hours as needed for Pain. ALPHAGAN P 0.1 % ophthalmic solution Generic drug:  brimonidine Apply 2 Drops to eye two (2) times a day. aspirin 325 mg tablet Commonly known as:  ASPIRIN Take 325 mg by mouth daily. Calcium Carbonate-Vit D3-Min 600 mg calcium- 400 unit Tab Take 1 Tab by mouth two (2) times a day. For calcium and vitamin D  
  
 dorzolamide 2 % ophthalmic solution Commonly known as:  TRUSOPT Administer 2 Drops to both eyes two (2) times a day. Indications: OPEN ANGLE GLAUCOMA  
  
 losartan-hydroCHLOROthiazide 50-12.5 mg per tablet Commonly known as:  HYZAAR Take 0.5 Tabs by mouth daily. For blood pressure SIMBRINZA 1-0.2 % Drps Generic drug:  brinzolamide-brimonidine SHAKE LQ AND INT 1 GTT IN OU TID  
  
 traMADol 50 mg tablet Commonly known as:  ULTRAM  
TAKE 1 TABLET BY MOUTH EVERY 8 HOURS AS NEEDED FOR PAIN  
  
 TRAVATAN Z 0.004 % ophthalmic solution Generic drug:  travoprost  
Administer 1 Drop to both eyes every evening. VITAMIN D3 2,000 unit Tab Generic drug:  cholecalciferol (vitamin D3) Take  by mouth. We Performed the Following C REACTIVE PROTEIN, QT [74383 CPT(R)] CBC W/O DIFF [32002 CPT(R)] LIPID PANEL [24484 CPT(R)] METABOLIC PANEL, COMPREHENSIVE [05951 CPT(R)] REFERRAL TO ORTHOPEDIC SURGERY [REF62 Custom] SED RATE (ESR) [id number] CPT(R)] VITAMIN B12 [id number] CPT(R)] VITAMIN D, 25 HYDROXY [id number] CPT(R)] Follow-up Instructions Return in about 6 months (around 7/3/2018) for blood pressure. Referral Information Referral ID Referred By Referred To  
  
 [id number] [facility]   
   [address] [place] [place] Nick Velazco Visits Status Start Date End Date 1 New Request 1/3/18 1/3/19 If your referral has a status of pending review or denied, additional information will be sent to support the outcome of this decision. Patient Instructions Numbness and Tingling: Care Instructions Your Care Instructions Many things can cause numbness or tingling. Swelling may put pressure on a nerve. This could cause you to lose feeling or have a pins-and-needles sensation on part of your body. Nerves may be damaged from trauma, toxins, or diseases, such as diabetes or multiple sclerosis (MS). Sometimes, though, the cause is not clear. If there is no clear reason for your symptoms, and you are not having any other symptoms, your doctor may suggest watching and waiting for a while to see if the numbness or tingling goes away on its own. Your doctor may want you to have blood or nerve tests to find the cause of your symptoms. Follow-up care is a key part of your treatment and safety. Be sure to make and go to all appointments, and call your doctor if you are having problems. It's also a good idea to know your test results and keep a list of the medicines you take. How can you care for yourself at home? · If your doctor prescribes medicine, take it exactly as directed. Call your doctor if you think you are having a problem with your medicine. · If you have any swelling, put ice or a cold pack on the area for 10 to 20 minutes at a time. Put a thin cloth between the ice and your skin. When should you call for help? Call 911 anytime you think you may need emergency care. For example, call if: 
? · You have weakness, numbness, or tingling in both legs. ? · You lose bowel or bladder control. ? · You have symptoms of a stroke. These may include: 
¨ Sudden numbness, tingling, weakness, or loss of movement in your face, arm, or leg, especially on only one side of your body. ¨ Sudden vision changes. ¨ Sudden trouble speaking. ¨ Sudden confusion or trouble understanding simple statements. ¨ Sudden problems with walking or balance. ¨ A sudden, severe headache that is different from past headaches. ? Watch closely for changes in your health, and be sure to contact your doctor if you have any problems, or if: 
? · You do not get better as expected. Where can you learn more? Go to http://mehreen-grzegorz.info/. Enter T711 in the search box to learn more about \"Numbness and Tingling: Care Instructions. \" Current as of: October 14, 2016 Content Version: 11.4 © 7100-2132 [employer]. Care instructions adapted under license by [employer] (which disclaims liability or warranty for this information). If you have questions about a medical condition or this instruction, always ask your healthcare professional. [address] any warranty or liability for your use of this information. Introducing [facility]! Mark Torres introduces Hoonto patient portal. Now you can access parts of your medical record, email your doctor's office, and request medication refills online. 1. In your internet browser, go to [email or web address] 2. Click on the First Time User? Click Here link in the Sign In box. You will see the New Member Sign Up page. 3. Enter your Hoonto Access Code exactly as it appears below. You will not need to use this code after youve completed the sign-up process. If you do not sign up before the expiration date, you must request a new code. · Hoonto Access Code: [email or web address] Expires: 2/27/2018  1:14 PM 
 
4. Enter the last four digits of your Social Security Number (xxxx) and Date of Birth (mm/dd/yyyy) as indicated and click Submit. You will be taken to the next sign-up page. 5. Create a Hoonto ID.  This will be your Hoonto login ID and cannot be changed, so think of one that is secure and easy to remember. 6. Create a [facility] password. You can change your password at any time. 7. Enter your Password Reset Question and Answer. This can be used at a later time if you forget your password. 8. Enter your e-mail address. You will receive e-mail notification when new information is available in [address] 9. Click Sign Up. You can now view and download portions of your medical record. 10. Click the Download Summary menu link to download a portable copy of your medical information. If you have questions, please visit the Frequently Asked Questions section of the [facility] website. Remember, [facility] is NOT to be used for urgent needs. For medical emergencies, dial 911. Now available from your iPhone and Android! Please provide this summary of care documentation to your next provider. Your primary care clinician is listed as Sylvester Flannery. If you have any questions after today's visit, please call [phone].

## 2018-01-04 LAB
25(OH)D3+25(OH)D2 SERPL-MCNC: 41.9 NG/ML (ref 30–100)
ALBUMIN SERPL-MCNC: 4.3 G/DL (ref 3.5–4.7)
ALBUMIN/GLOB SERPL: 2.2 {RATIO} (ref 1.2–2.2)
ALP SERPL-CCNC: 84 IU/L (ref 39–117)
ALT SERPL-CCNC: 25 IU/L (ref 0–32)
AST SERPL-CCNC: 30 IU/L (ref 0–40)
BILIRUB SERPL-MCNC: 0.3 MG/DL (ref 0–1.2)
BUN SERPL-MCNC: 19 MG/DL (ref 8–27)
BUN/CREAT SERPL: 19 (ref 12–28)
CALCIUM SERPL-MCNC: 9.5 MG/DL (ref 8.7–10.3)
CHLORIDE SERPL-SCNC: 102 MMOL/L (ref 96–106)
CHOLEST SERPL-MCNC: 220 MG/DL (ref 100–199)
CO2 SERPL-SCNC: 23 MMOL/L (ref 18–29)
CREAT SERPL-MCNC: 1 MG/DL (ref 0.57–1)
CRP SERPL-MCNC: 4.1 MG/L (ref 0–4.9)
ERYTHROCYTE [DISTWIDTH] IN BLOOD BY AUTOMATED COUNT: 15 % (ref 12.3–15.4)
ERYTHROCYTE [SEDIMENTATION RATE] IN BLOOD BY WESTERGREN METHOD: 28 MM/HR (ref 0–40)
GLOBULIN SER CALC-MCNC: 2 G/DL (ref 1.5–4.5)
GLUCOSE SERPL-MCNC: 81 MG/DL (ref 65–99)
HCT VFR BLD AUTO: 37.1 % (ref 34–46.6)
HDLC SERPL-MCNC: 74 MG/DL
HGB BLD-MCNC: 12 G/DL (ref 11.1–15.9)
INTERPRETATION, 910389: NORMAL
INTERPRETATION: NORMAL
LDLC SERPL CALC-MCNC: 114 MG/DL (ref 0–99)
MCH RBC QN AUTO: 26.3 PG (ref 26.6–33)
MCHC RBC AUTO-ENTMCNC: 32.3 G/DL (ref 31.5–35.7)
MCV RBC AUTO: 81 FL (ref 79–97)
PDF IMAGE, 910387: NORMAL
PLATELET # BLD AUTO: 227 X10E3/UL (ref 150–379)
POTASSIUM SERPL-SCNC: 3.7 MMOL/L (ref 3.5–5.2)
PROT SERPL-MCNC: 6.3 G/DL (ref 6–8.5)
RBC # BLD AUTO: 4.57 X10E6/UL (ref 3.77–5.28)
SODIUM SERPL-SCNC: 142 MMOL/L (ref 134–144)
TRIGL SERPL-MCNC: 159 MG/DL (ref 0–149)
VIT B12 SERPL-MCNC: 264 PG/ML (ref 232–1245)
VLDLC SERPL CALC-MCNC: 32 MG/DL (ref 5–40)
WBC # BLD AUTO: 4.6 X10E3/UL (ref 3.4–10.8)

## 2018-03-27 ENCOUNTER — OFFICE VISIT (OUTPATIENT)
Dept: FAMILY MEDICINE CLINIC | Age: 83
End: 2018-03-27

## 2018-03-27 VITALS
OXYGEN SATURATION: 97 % | HEART RATE: 68 BPM | HEIGHT: 65 IN | DIASTOLIC BLOOD PRESSURE: 67 MMHG | RESPIRATION RATE: 17 BRPM | BODY MASS INDEX: 27.82 KG/M2 | TEMPERATURE: 98.1 F | SYSTOLIC BLOOD PRESSURE: 142 MMHG | WEIGHT: 167 LBS

## 2018-03-27 DIAGNOSIS — M79.10 MYALGIA: ICD-10-CM

## 2018-03-27 DIAGNOSIS — M50.30 DDD (DEGENERATIVE DISC DISEASE), CERVICAL: ICD-10-CM

## 2018-03-27 DIAGNOSIS — M15.9 PRIMARY OSTEOARTHRITIS INVOLVING MULTIPLE JOINTS: Primary | ICD-10-CM

## 2018-03-27 LAB
QUICKVUE INFLUENZA TEST: NEGATIVE
VALID INTERNAL CONTROL?: YES

## 2018-03-27 NOTE — PATIENT INSTRUCTIONS
Cervical Spinal Fusion: Before Your Surgery  What is cervical spinal fusion? Cervical spinal fusion is surgery that joins two or more of the vertebrae in your neck. When these bones are joined together, it's called fusion. After the joints are fused, they can no longer move. During the surgery, the doctor uses bone to make a \"bridge\" between your vertebrae. This bridge is strengthened with metal plates and screws. In most cases, the doctor uses bone from another part of your body or bone that has been donated to a bone bank. But sometimes artificial bone is used. To do the surgery, the doctor makes a cut in either the front or the back of your neck. The cut is called an incision. It leaves a scar that fades with time. After surgery, you will stay in the hospital for a few days. Your neck will feel stiff or sore. You will get medicine to help with pain. Most people can go back to work after 4 to 6 weeks. But it may take a few months to get back to all of your usual activities. Follow-up care is a key part of your treatment and safety. Be sure to make and go to all appointments, and call your doctor if you are having problems. It's also a good idea to know your test results and keep a list of the medicines you take. What happens before surgery? ?Surgery can be stressful. This information will help you understand what you can expect. And it will help you safely prepare for surgery. ? Preparing for surgery  ? · Understand exactly what surgery is planned, along with the risks, benefits, and other options. · Tell your doctors ALL the medicines, vitamins, supplements, and herbal remedies you take. Some of these can increase the risk of bleeding or interact with anesthesia. ? · If you take blood thinners, such as warfarin (Coumadin), clopidogrel (Plavix), or aspirin, be sure to talk to your doctor. He or she will tell you if you should stop taking these medicines before your surgery.  Make sure that you understand exactly what your doctor wants you to do.   ? · Your doctor will tell you which medicines to take or stop before your surgery. You may need to stop taking certain medicines a week or more before surgery. So talk to your doctor as soon as you can.   ? · If you have an advance directive, let your doctor know. It may include a living will and a durable power of  for health care. Bring a copy to the hospital. If you don't have one, you may want to prepare one. It lets your doctor and loved ones know your health care wishes. Doctors advise that everyone prepare these papers before any type of surgery or procedure. What happens on the day of surgery? · Follow the instructions exactly about when to stop eating and drinking. If you don't, your surgery may be canceled. If your doctor told you to take your medicines on the day of surgery, take them with only a sip of water. ? · Take a bath or shower before you come in for your surgery. Do not apply lotions, perfumes, deodorants, or nail polish. ? · Do not shave the surgical site yourself. ? · Take off all jewelry and piercings. And take out contact lenses, if you wear them. ? At the hospital or surgery center   · Bring a picture ID. ? · The area for surgery is often marked to make sure there are no errors. ? · You will be kept comfortable and safe by your anesthesia provider. You will be asleep during the surgery. ? · The surgery usually takes about 1 to 1½ hours. ? · When you wake up, you will be lying on your back. You will have a soft or hard collar around your neck. This will protect and support your neck. It will also keep you from turning your head. ? · You may have a small plastic tube coming out of your incision. This is to drain fluids. It's usually taken out in 1 or 2 days. Going home   · Be sure you have someone to drive you home. Anesthesia and pain medicine make it unsafe for you to drive.    ? · You will be given more specific instructions about recovering from your surgery. They will cover things like diet, wound care, follow-up care, driving, and getting back to your normal routine. When should you call your doctor? · You have questions or concerns. ? · You do not understand how to prepare for your surgery. ? · You become ill before surgery (such as fever, flu, or a cold). ? · You need to reschedule or have changed your mind about having the surgery. Where can you learn more? Go to http://mehreen-grzegorz.info/. Enter S126 in the search box to learn more about \"Cervical Spinal Fusion: Before Your Surgery. \"  Current as of: March 21, 2017  Content Version: 11.4  © 4787-1174 [employer]wise[employer]. Care instructions adapted under license by [employer] (which disclaims liability or warranty for this information). If you have questions about a medical condition or this instruction, always ask your healthcare professional. [address] any warranty or liability for your use of this information.

## 2018-03-27 NOTE — PROGRESS NOTES
[facility] Note    Mark Wilson is a 80 y.o. female who was seen in clinic today (3/27/2018). Subjective:  Osteoarthritis and Chronic Pain:  Patient has osteoarthritis, arthralgias and myalgias, primarily affecting the neck, back and legs. Symptoms onset: problem is longstanding. Rheumatological ROS: ongoing significant pain in back which is stable and controlled by PRN meds and using Tramadol intermittently. Response to treatment plan: intermittent. Patient is going to orthopedics for ongoing radiculopathy of bilateral arms. Prior to Admission medications    Medication Sig Start Date End Date Taking? Authorizing Provider   cholecalciferol, vitamin D3, (VITAMIN D3) 2,000 unit tab Take  by mouth. Yes Historical Provider   SIMBRINZA 1-0.2 % drps SHAKE LQ AND INT 1 GTT IN OU TID 11/2/17  Yes Historical Provider   losartan-hydroCHLOROthiazide (HYZAAR) 50-12.5 mg per tablet Take 0.5 Tabs by mouth daily. For blood pressure 10/4/17  Yes Sylvester Flannery NP   traMADol (ULTRAM) 50 mg tablet TAKE 1 TABLET BY MOUTH EVERY 8 HOURS AS NEEDED FOR PAIN 10/4/17  Yes Sylvester Flannery NP   Calcium Carbonate-Vit D3-Min 600 mg calcium- 400 unit tab Take 1 Tab by mouth two (2) times a day. For calcium and vitamin D 10/9/15  Yes Sylvester Flannery NP   aspirin (ASPIRIN) 325 mg tablet Take 325 mg by mouth daily. Yes Historical Provider   ibuprofen (ADVIL) 200 mg tablet Take 200 mg by mouth every eight (8) hours as needed for Pain. Yes Historical Provider   travoprost (TRAVATAN Z) 0.004 % ophthalmic solution Administer 1 Drop to both eyes every evening. Yes Historical Provider   dorzolamide (TRUSOPT) 2 % ophthalmic solution Administer 2 Drops to both eyes two (2) times a day. Indications: OPEN ANGLE GLAUCOMA   Yes Historical Provider   ALPHAGAN P 0.1 % Drop Apply 2 Drops to eye two (2) times a day.  2/25/11  Yes Historical Provider          Allergies   Allergen Reactions    Asmanex Twisthaler [Mometasone] Other (comments)     Dizziness and mouth sores    Fosamax [Alendronate] Other (comments)     Leg cramps    Prednisone Anxiety         ROS  See HPI    Objective:   Physical Exam   Constitutional: She is oriented to person, place, and time. She appears well-developed and well-nourished. Neck: Normal range of motion. Neck supple. No JVD present. Carotid bruit is not present. No thyromegaly present. Cardiovascular: Normal rate, regular rhythm and intact distal pulses. Exam reveals no gallop and no friction rub. No murmur heard. Pulmonary/Chest: Effort normal and breath sounds normal. No respiratory distress. Musculoskeletal: She exhibits no edema. Lymphadenopathy:     She has no cervical adenopathy. Neurological: She is alert and oriented to person, place, and time. Psychiatric: She has a normal mood and affect. Her behavior is normal.   Nursing note and vitals reviewed. Visit Vitals    /67 (BP 1 Location: Right arm, BP Patient Position: Sitting)    Pulse 68    Temp 98.1 °F (36.7 °C) (Oral)    Resp 17    Ht 5' 5\" (1.651 m)    Wt 167 lb (75.8 kg)    SpO2 97%    BMI 27.79 kg/m2       Assessment & Plan:  Diagnoses and all orders for this visit:    1. Primary osteoarthritis involving multiple joints  Request physical therapy evaluation and treatment. NSAIDs and Tramadol PRN.   -     REFERRAL TO PHYSICAL THERAPY    2. Myalgia  Secondary to #1.   -     AMB POC RAPID INFLUENZA TEST: negative    3. DDD (degenerative disc disease), cervical  Request physical therapy evaluation and treatment.   -     REFERRAL TO PHYSICAL THERAPY        I have discussed the diagnosis with the patient and the intended plan as seen in the above orders. The patient has received an after-visit summary along with patient information handout. I have discussed medication side effects and warnings with the patient as well.     Follow-up Disposition:  Return if symptoms worsen or fail to improve.         Angela Gutiérrez NP

## 2018-03-27 NOTE — PROGRESS NOTES
Chief Complaint   Patient presents with    Leg Pain     9/10 throbbing muscle pain inlegs bilateral- started last night- Took OTC advil at 0900 this morning    Stiff Neck     neck stiffness- started last night     Arm Pain     9/10 aching pain in arms- bilateral- started last night       1. Have you been to the ER, urgent care clinic since your last visit? Hospitalized since your last visit? No    2. Have you seen or consulted any other health care providers outside of the [address] [place] [address] since your last visit? Include any pap smears or colon screening.  No

## 2018-03-27 NOTE — MR AVS SNAPSHOT
[facility] 
 
 
 [facility] Janie Turpin 13 
[phone] Patient: Kina Brown MRN: [id number] Visit Information Date & Time Provider Department Dept. Phone Encounter #  
 3/27/2018  3:45 PM Selena Beaulieu  [facility] [phone] [id number] Follow-up Instructions Return if symptoms worsen or fail to improve. Upcoming Health Maintenance Date Due Pneumococcal 65+ Low/Medium Risk (1 of 2 - PCV13) 9/10/1997 MEDICARE YEARLY EXAM 2018 GLAUCOMA SCREENING Q2Y 2019 DTaP/Tdap/Td series (2 - Td) 2027 Allergies as of 3/27/2018  Review Complete On: 3/27/2018 By: Bowen Jeronimo LPN Severity Noted Reaction Type Reactions Asmanex Twisthaler [Mometasone]  2012    Other (comments) Dizziness and mouth sores Fosamax [Alendronate]  2010    Other (comments) Leg cramps Prednisone  2011    Anxiety Current Immunizations  Never Reviewed Name Date Influenza High Dose Vaccine PF 10/7/2015 Not reviewed this visit You Were Diagnosed With   
  
 Codes Comments Primary osteoarthritis involving multiple joints    -  Primary ICD-10-CM: M15.0 ICD-9-CM: 715.09 Myalgia     ICD-10-CM: M79.1 ICD-9-CM: 729.1 DDD (degenerative disc disease), cervical     ICD-10-CM: M50.30 ICD-9-CM: 722.4 Vitals BP Pulse Temp Resp Height(growth percentile) Weight(growth percentile) 142/67 (BP 1 Location: Right arm, BP Patient Position: Sitting) 68 98.1 °F (36.7 °C) (Oral) 17 5' 5\" (1.651 m) 167 lb (75.8 kg) SpO2 BMI OB Status Smoking Status 97% 27.79 kg/m2 Postmenopausal Former Smoker Vitals History BMI and BSA Data Body Mass Index Body Surface Area  
 27.79 kg/m 2 1.86 m 2 Preferred Pharmacy Pharmacy Name Phone  [phone] AT [address] [place] [address] [address] [facility] [address] [phone] Your Updated Medication List  
  
   
This list is accurate as of 3/27/18  4:15 PM.  Always use your most recent med list. ADVIL 200 mg tablet Generic drug:  ibuprofen Take 200 mg by mouth every eight (8) hours as needed for Pain. ALPHAGAN P 0.1 % ophthalmic solution Generic drug:  brimonidine Apply 2 Drops to eye two (2) times a day. aspirin 325 mg tablet Commonly known as:  ASPIRIN Take 325 mg by mouth daily. Calcium Carbonate-Vit D3-Min 600 mg calcium- 400 unit Tab Take 1 Tab by mouth two (2) times a day. For calcium and vitamin D  
  
 dorzolamide 2 % ophthalmic solution Commonly known as:  TRUSOPT Administer 2 Drops to both eyes two (2) times a day. Indications: OPEN ANGLE GLAUCOMA  
  
 losartan-hydroCHLOROthiazide 50-12.5 mg per tablet Commonly known as:  HYZAAR Take 0.5 Tabs by mouth daily. For blood pressure SIMBRINZA 1-0.2 % Drps Generic drug:  brinzolamide-brimonidine SHAKE LQ AND INT 1 GTT IN OU TID  
  
 traMADol 50 mg tablet Commonly known as:  ULTRAM  
TAKE 1 TABLET BY MOUTH EVERY 8 HOURS AS NEEDED FOR PAIN  
  
 TRAVATAN Z 0.004 % ophthalmic solution Generic drug:  travoprost  
Administer 1 Drop to both eyes every evening. VITAMIN D3 2,000 unit Tab Generic drug:  cholecalciferol (vitamin D3) Take  by mouth. We Performed the Following AMB POC RAPID INFLUENZA TEST [74326 CPT(R)] REFERRAL TO PHYSICAL THERAPY [FWM19 Custom] Follow-up Instructions Return if symptoms worsen or fail to improve. Referral Information Referral ID Referred By Referred To  
  
 [id number] Lisa Kowalski Not Available Visits Status Start Date End Date 1 New Request 3/27/18 3/27/19 If your referral has a status of pending review or denied, additional information will be sent to support the outcome of this decision. Patient Instructions Cervical Spinal Fusion: Before Your Surgery What is cervical spinal fusion? Cervical spinal fusion is surgery that joins two or more of the vertebrae in your neck. When these bones are joined together, it's called fusion. After the joints are fused, they can no longer move. During the surgery, the doctor uses bone to make a \"bridge\" between your vertebrae. This bridge is strengthened with metal plates and screws. In most cases, the doctor uses bone from another part of your body or bone that has been donated to a bone bank. But sometimes artificial bone is used. To do the surgery, the doctor makes a cut in either the front or the back of your neck. The cut is called an incision. It leaves a scar that fades with time. After surgery, you will stay in the hospital for a few days. Your neck will feel stiff or sore. You will get medicine to help with pain. Most people can go back to work after 4 to 6 weeks. But it may take a few months to get back to all of your usual activities. Follow-up care is a key part of your treatment and safety. Be sure to make and go to all appointments, and call your doctor if you are having problems. It's also a good idea to know your test results and keep a list of the medicines you take. What happens before surgery? ?Surgery can be stressful. This information will help you understand what you can expect. And it will help you safely prepare for surgery. ? Preparing for surgery ? · Understand exactly what surgery is planned, along with the risks, benefits, and other options. · Tell your doctors ALL the medicines, vitamins, supplements, and herbal remedies you take. Some of these can increase the risk of bleeding or interact with anesthesia. ? · If you take blood thinners, such as warfarin (Coumadin), clopidogrel (Plavix), or aspirin, be sure to talk to your doctor. He or she will tell you if you should stop taking these medicines before your surgery.  Make sure that you understand exactly what your doctor wants you to do.  
? · Your doctor will tell you which medicines to take or stop before your surgery. You may need to stop taking certain medicines a week or more before surgery. So talk to your doctor as soon as you can.  
? · If you have an advance directive, let your doctor know. It may include a living will and a durable power of  for health care. Bring a copy to the hospital. If you don't have one, you may want to prepare one. It lets your doctor and loved ones know your health care wishes. Doctors advise that everyone prepare these papers before any type of surgery or procedure. What happens on the day of surgery? · Follow the instructions exactly about when to stop eating and drinking. If you don't, your surgery may be canceled. If your doctor told you to take your medicines on the day of surgery, take them with only a sip of water. ? · Take a bath or shower before you come in for your surgery. Do not apply lotions, perfumes, deodorants, or nail polish. ? · Do not shave the surgical site yourself. ? · Take off all jewelry and piercings. And take out contact lenses, if you wear them. ? At the hospital or surgery center · Bring a picture ID. ? · The area for surgery is often marked to make sure there are no errors. ? · You will be kept comfortable and safe by your anesthesia provider. You will be asleep during the surgery. ? · The surgery usually takes about 1 to 1½ hours. ? · When you wake up, you will be lying on your back. You will have a soft or hard collar around your neck. This will protect and support your neck. It will also keep you from turning your head. ? · You may have a small plastic tube coming out of your incision. This is to drain fluids. It's usually taken out in 1 or 2 days. Going home · Be sure you have someone to drive you home. Anesthesia and pain medicine make it unsafe for you to drive. ? · You will be given more specific instructions about recovering from your surgery. They will cover things like diet, wound care, follow-up care, driving, and getting back to your normal routine. When should you call your doctor? · You have questions or concerns. ? · You do not understand how to prepare for your surgery. ? · You become ill before surgery (such as fever, flu, or a cold). ? · You need to reschedule or have changed your mind about having the surgery. Where can you learn more? Go to http://mehreen-grzegorz.info/. Enter T034 in the search box to learn more about \"Cervical Spinal Fusion: Before Your Surgery. \" Current as of: March 21, 2017 Content Version: 11.4 © 0207-9531 [employer]. Care instructions adapted under license by [employer] (which disclaims liability or warranty for this information). If you have questions about a medical condition or this instruction, always ask your healthcare professional. [place] [address] any warranty or liability for your use of this information. Introducing [facility] & HEALTH SERVICES! Lyndsey Britton introduces rPath patient portal. Now you can access parts of your medical record, email your doctor's office, and request medication refills online. 1. In your internet browser, go to [email or web address] 2. Click on the First Time User? Click Here link in the Sign In box. You will see the New Member Sign Up page. 3. Enter your rPath Access Code exactly as it appears below. You will not need to use this code after youve completed the sign-up process. If you do not sign up before the expiration date, you must request a new code. · rPath Access Code: [email or web address] Expires: 6/25/2018  4:14 PM 
 
4. Enter the last four digits of your Social Security Number (xxxx) and Date of Birth (mm/dd/yyyy) as indicated and click Submit. You will be taken to the next sign-up page. 5. Create a [employer] Innov[employer] ID. This will be your [employer] login ID and cannot be changed, so think of one that is secure and easy to remember. 6. Create a [employer] password. You can change your password at any time. 7. Enter your Password Reset Question and Answer. This can be used at a later time if you forget your password. 8. Enter your e-mail address. You will receive e-mail notification when new information is available in [address] 9. Click Sign Up. You can now view and download portions of your medical record. 10. Click the Download Summary menu link to download a portable copy of your medical information. If you have questions, please visit the Frequently Asked Questions section of the [employer] website. Remember, [employer]ate is NOT to be used for urgent needs. For medical emergencies, dial 911. Now available from your iPhone and Android! Please provide this summary of care documentation to your next provider. Your primary care clinician is listed as Steven Johnson. If you have any questions after today's visit, please call [phone].

## 2018-06-20 ENCOUNTER — TELEPHONE (OUTPATIENT)
Dept: FAMILY MEDICINE CLINIC | Age: 83
End: 2018-06-20

## 2018-06-20 NOTE — TELEPHONE ENCOUNTER
Patient is calling in regards to having a referral. She states that she has lost her paperwork from her last appointment with the referral to cardiologist listed. She states that her daughter is wanting to see a cardiologist, due to her blood pressure possible being up. She states that she is not sure if she is needing an appointment in our office as well.        Best call back [phone]

## 2018-06-21 NOTE — TELEPHONE ENCOUNTER
[phone] spoke to patient gave her Dr Paulina Cortez but per patient there's another cardiologist you mentioned that starts with H I did mention Dr. Sam Davison but she doesn't think that's the one I advised will send message to [facility]

## 2018-09-21 ENCOUNTER — OFFICE VISIT (OUTPATIENT)
Dept: FAMILY MEDICINE CLINIC | Age: 83
End: 2018-09-21

## 2018-09-21 ENCOUNTER — HOSPITAL ENCOUNTER (OUTPATIENT)
Dept: LAB | Age: 83
Discharge: HOME | End: 2018-09-21
Payer: MEDICARE

## 2018-09-21 VITALS
BODY MASS INDEX: 27.49 KG/M2 | HEIGHT: 65 IN | OXYGEN SATURATION: 97 % | DIASTOLIC BLOOD PRESSURE: 65 MMHG | WEIGHT: 165 LBS | HEART RATE: 66 BPM | SYSTOLIC BLOOD PRESSURE: 115 MMHG | RESPIRATION RATE: 17 BRPM | TEMPERATURE: 97.9 F

## 2018-09-21 DIAGNOSIS — Z23 ENCOUNTER FOR IMMUNIZATION: ICD-10-CM

## 2018-09-21 DIAGNOSIS — E78.2 MIXED HYPERLIPIDEMIA: ICD-10-CM

## 2018-09-21 DIAGNOSIS — I10 ESSENTIAL HYPERTENSION: Primary | ICD-10-CM

## 2018-09-21 DIAGNOSIS — E55.9 VITAMIN D DEFICIENCY: ICD-10-CM

## 2018-09-21 DIAGNOSIS — Z71.89 ACP (ADVANCE CARE PLANNING): ICD-10-CM

## 2018-09-21 DIAGNOSIS — M15.9 PRIMARY OSTEOARTHRITIS INVOLVING MULTIPLE JOINTS: ICD-10-CM

## 2018-09-21 DIAGNOSIS — N18.30 STAGE 3 CHRONIC KIDNEY DISEASE (HCC): ICD-10-CM

## 2018-09-21 DIAGNOSIS — Z00.00 ENCOUNTER FOR MEDICARE ANNUAL WELLNESS EXAM: ICD-10-CM

## 2018-09-21 PROCEDURE — 80053 COMPREHEN METABOLIC PANEL: CPT

## 2018-09-21 PROCEDURE — 84443 ASSAY THYROID STIM HORMONE: CPT

## 2018-09-21 PROCEDURE — 82306 VITAMIN D 25 HYDROXY: CPT

## 2018-09-21 PROCEDURE — 85025 COMPLETE CBC W/AUTO DIFF WBC: CPT

## 2018-09-21 PROCEDURE — 36415 COLL VENOUS BLD VENIPUNCTURE: CPT

## 2018-09-21 RX ORDER — CEPHALEXIN 500 MG/1
CAPSULE ORAL
COMMUNITY
Start: 2018-05-08 | End: 2019-02-21 | Stop reason: ALTCHOICE

## 2018-09-21 NOTE — PATIENT INSTRUCTIONS

## 2018-09-21 NOTE — MR AVS SNAPSHOT
[address] [place] [address] 
[phone] Patient: Rosie Boyer MRN: [id number] YJK:[birth year] Visit Information Date & Time Provider Department Dept. Phone Encounter #  
 9/21/2018 11:30 AM Mathew Sexton NP [facility] [phone] [id number] Follow-up Instructions Return in about 6 months (around 3/21/2019) for disease management. Upcoming Health Maintenance Date Due Pneumococcal 65+ Low/Medium Risk (1 of 2 - PCV13) 9/10/1997 MEDICARE YEARLY EXAM 4/27/2018 Influenza Age 5 to Adult 8/1/2018 GLAUCOMA SCREENING Q2Y 8/22/2019 DTaP/Tdap/Td series (2 - Td) 8/4/2027 Allergies as of 9/21/2018  Review Complete On: 9/21/2018 By: Ember Castaneda LPN Severity Noted Reaction Type Reactions Asmanex Twisthaler [Mometasone]  07/27/2012    Other (comments), Nausea and Vomiting Dizziness and mouth sores Dizziness and mouth sores Fosamax [Alendronate]  05/04/2010    Other (comments), Nausea and Vomiting Leg cramps Leg cramps Prednisone Low 05/17/2011    Anxiety Current Immunizations  Never Reviewed Name Date Influenza High Dose Vaccine PF 10/7/2015 Not reviewed this visit You Were Diagnosed With   
  
 Codes Comments Essential hypertension    -  Primary ICD-10-CM: I10 
ICD-9-CM: 401.9 Primary osteoarthritis involving multiple joints     ICD-10-CM: M15.0 ICD-9-CM: 715.09 Stage 3 chronic kidney disease     ICD-10-CM: N18.3 ICD-9-CM: 581. 3 Mixed hyperlipidemia     ICD-10-CM: E78.2 ICD-9-CM: 272.2 Vitamin D deficiency     ICD-10-CM: E55.9 ICD-9-CM: 268.9 Vitals BP Pulse Temp Resp Height(growth percentile) Weight(growth percentile)  
 115/65 (BP 1 Location: Left arm, BP Patient Position: Sitting) 66 97.9 °F (36.6 °C) (Oral) 17 5' 5\" (1.651 m) 165 lb (74.8 kg) SpO2 BMI OB Status Smoking Status 97% 27.46 kg/m2 Postmenopausal Former Smoker Vitals History BMI and BSA Data Body Mass Index Body Surface Area  
 27.46 kg/m 2 1.85 m 2 Preferred Pharmacy Pharmacy Name Phone [facility] [address] [place] [address], [address] [place] [address] [phone] Your Updated Medication List  
  
   
This list is accurate as of 9/21/18 11:57 AM.  Always use your most recent med list. ADVIL 200 mg tablet Generic drug:  ibuprofen Take 200 mg by mouth every eight (8) hours as needed for Pain. ALPHAGAN P 0.1 % ophthalmic solution Generic drug:  brimonidine Apply 2 Drops to eye two (2) times a day. aspirin 325 mg tablet Commonly known as:  ASPIRIN Take 325 mg by mouth daily. Calcium Carbonate-Vit D3-Min 600 mg calcium- 400 unit Tab Take 1 Tab by mouth two (2) times a day. For calcium and vitamin D  
  
 cephALEXin 500 mg capsule Commonly known as:  Johnson Fernández TK 4 CS PO 1 HOUR PRIOR TO DENTAL APPOINTMENT  
  
 dorzolamide 2 % ophthalmic solution Commonly known as:  TRUSOPT Administer 2 Drops to both eyes two (2) times a day. Indications: OPEN ANGLE GLAUCOMA  
  
 losartan-hydroCHLOROthiazide 50-12.5 mg per tablet Commonly known as:  HYZAAR Take 0.5 Tabs by mouth daily. For blood pressure SIMBRINZA 1-0.2 % Drps Generic drug:  brinzolamide-brimonidine SHAKE LQ AND INT 1 GTT IN OU TID  
  
 traMADol 50 mg tablet Commonly known as:  ULTRAM  
TAKE 1 TABLET BY MOUTH EVERY 8 HOURS AS NEEDED FOR PAIN  
  
 TRAVATAN Z 0.004 % ophthalmic solution Generic drug:  travoprost  
Administer 1 Drop to both eyes every evening. VITAMIN D3 2,000 unit Tab Generic drug:  cholecalciferol (vitamin D3) Take  by mouth. We Performed the Following CBC WITH AUTOMATED DIFF [09974 CPT(R)] METABOLIC PANEL, COMPREHENSIVE [18474 CPT(R)] TSH AND FREE T4 [47368 CPT(R)] VITAMIN D, 25 HYDROXY [id number] CPT(R)] Follow-up Instructions Return in about 6 months (around 3/21/2019) for disease management. Patient Instructions High Blood Pressure: Care Instructions Your Care Instructions If your blood pressure is usually above 130/80, you have high blood pressure, or hypertension. That means the top number is 130 or higher or the bottom number is 80 or higher, or both. Despite what a lot of people think, high blood pressure usually doesn't cause headaches or make you feel dizzy or lightheaded. It usually has no symptoms. But it does increase your risk for heart attack, stroke, and kidney or eye damage. The higher your blood pressure, the more your risk increases. Your doctor will give you a goal for your blood pressure. Your goal will be based on your health and your age. Lifestyle changes, such as eating healthy and being active, are always important to help lower blood pressure. You might also take medicine to reach your blood pressure goal. 
Follow-up care is a key part of your treatment and safety. Be sure to make and go to all appointments, and call your doctor if you are having problems. It's also a good idea to know your test results and keep a list of the medicines you take. How can you care for yourself at home? Medical treatment · If you stop taking your medicine, your blood pressure will go back up. You may take one or more types of medicine to lower your blood pressure. Be safe with medicines. Take your medicine exactly as prescribed. Call your doctor if you think you are having a problem with your medicine. · Talk to your doctor before you start taking aspirin every day. Aspirin can help certain people lower their risk of a heart attack or stroke. But taking aspirin isn't right for everyone, because it can cause serious bleeding. · See your doctor regularly.  You may need to see the doctor more often at first or until your blood pressure comes down. · If you are taking blood pressure medicine, talk to your doctor before you take decongestants or anti-inflammatory medicine, such as ibuprofen. Some of these medicines can raise blood pressure. · Learn how to check your blood pressure at home. Lifestyle changes · Stay at a healthy weight. This is especially important if you put on weight around the waist. Losing even 10 pounds can help you lower your blood pressure. · If your doctor recommends it, get more exercise. Walking is a good choice. Bit by bit, increase the amount you walk every day. Try for at least 30 minutes on most days of the week. You also may want to swim, bike, or do other activities. · Avoid or limit alcohol. Talk to your doctor about whether you can drink any alcohol. · Try to limit how much sodium you eat to less than 2,300 milligrams (mg) a day. Your doctor may ask you to try to eat less than 1,500 mg a day. · Eat plenty of fruits (such as bananas and oranges), vegetables, legumes, whole grains, and low-fat dairy products. · Lower the amount of saturated fat in your diet. Saturated fat is found in animal products such as milk, cheese, and meat. Limiting these foods may help you lose weight and also lower your risk for heart disease. · Do not smoke. Smoking increases your risk for heart attack and stroke. If you need help quitting, talk to your doctor about stop-smoking programs and medicines. These can increase your chances of quitting for good. When should you call for help? Call 911 anytime you think you may need emergency care. This may mean having symptoms that suggest that your blood pressure is causing a serious heart or blood vessel problem. Your blood pressure may be over 180/110. 
 For example, call 911 if: 
  · You have symptoms of a heart attack. These may include: ¨ Chest pain or pressure, or a strange feeling in the chest. 
¨ Sweating. ¨ Shortness of breath. ¨ Nausea or vomiting. ¨ Pain, pressure, or a strange feeling in the back, neck, jaw, or upper belly or in one or both shoulders or arms. ¨ Lightheadedness or sudden weakness. ¨ A fast or irregular heartbeat.  
  · You have symptoms of a stroke. These may include: 
¨ Sudden numbness, tingling, weakness, or loss of movement in your face, arm, or leg, especially on only one side of your body. ¨ Sudden vision changes. ¨ Sudden trouble speaking. ¨ Sudden confusion or trouble understanding simple statements. ¨ Sudden problems with walking or balance. ¨ A sudden, severe headache that is different from past headaches.  
  · You have severe back or belly pain.  
 Do not wait until your blood pressure comes down on its own. Get help right away. 
 Call your doctor now or seek immediate care if: 
  · Your blood pressure is much higher than normal (such as 180/110 or higher), but you don't have symptoms.  
  · You think high blood pressure is causing symptoms, such as: ¨ Severe headache. ¨ Blurry vision.  
 Watch closely for changes in your health, and be sure to contact your doctor if: 
  · Your blood pressure measures 140/90 or higher at least 2 times. That means the top number is 140 or higher or the bottom number is 90 or higher, or both.  
  · You think you may be having side effects from your blood pressure medicine.  
  · Your blood pressure is usually normal, but it goes above normal at least 2 times. Where can you learn more? Go to http://mehreen-grzegorz.info/. Enter S323 in the search box to learn more about \"High Blood Pressure: Care Instructions. \" Current as of: December 6, 2017 Content Version: 11.7 © 4205-7252 [employer]. Care instructions adapted under license by [employer] (which disclaims liability or warranty for this information).  If you have questions about a medical condition or this instruction, always ask your healthcare professional. [employer] disclaims any warranty or liability for your use of this information. Introducing [facility] & HEALTH SERVICES! Janet Aponte introduces [employer]Wunder[employer] patient portal. Now you can access parts of your medical record, email your doctor's office, and request medication refills online. 1. In your internet browser, go to [email or web address] 2. Click on the First Time User? Click Here link in the Sign In box. You will see the New Member Sign Up page. 3. Enter your 6Wunder[employer]der Access Code exactly as it appears below. You will not need to use this code after youve completed the sign-up process. If you do not sign up before the expiration date, you must request a new code. · [employer] Access Code: [email or web address] Expires: 12/20/2018 11:57 AM 
 
4. Enter the last four digits of your Social Security Number (xxxx) and Date of Birth (mm/dd/yyyy) as indicated and click Submit. You will be taken to the next sign-up page. 5. Create a 6Wunderkinder ID. This will be your 6Wunder[employer]der login ID and cannot be changed, so think of one that is secure and easy to remember. 6. Create a 6Wunderkinder password. You can change your password at any time. 7. Enter your Password Reset Question and Answer. This can be used at a later time if you forget your password. 8. Enter your e-mail address. You will receive e-mail notification when new information is available in [address] 9. Click Sign Up. You can now view and download portions of your medical record. 10. Click the Download Summary menu link to download a portable copy of your medical information. If you have questions, please visit the Frequently Asked Questions section of the [employer] website. Remember, [employer] is NOT to be used for urgent needs. For medical emergencies, dial 911. Now available from your iPhone and Android! Please provide this summary of care documentation to your next provider. Your primary care clinician is listed as Michelle Ashton. If you have any questions after today's visit, please call [phone].

## 2018-09-21 NOTE — PROGRESS NOTES
[facility] Note    Sravanthi Castillo is a 80 y.o. female who was seen in clinic today (9/21/2018). Subjective:  Cardiovascular Review:  The patient in for follow up of hypertension. Taking Losartan-HCTZ 50-12.5 mg - 1/2 tablet daily with adequate control. Home readings: 140/80s  Diet and Lifestyle: generally follows a low fat low cholesterol diet, generally follows a low sodium diet, sedentary, nonsmoker  Pertinent ROS: taking medications as instructed, no medication side effects noted, no TIA's, no chest pain on exertion, no swelling of ankles. Osteoarthritis and Chronic Pain:  Patient has osteoarthritis, primarily affecting the neck and back. Symptoms onset: problem is longstanding. Rheumatological ROS:  Using Aspirin daily and  Tramadol intermittently. Response to treatment plan: symptoms have progressed to a point and plateaued. Reports continued burning and tingling in bilateral arms. Reports diminished  strength at times. Seen by neurology, Dr. Diana Crenshaw and advised that she had carpal tunnel syndrome. Patient reports intermittent itching of arms and abdomen over last two weeks. No home treatments to date. Prior to Admission medications    Medication Sig Start Date End Date Taking? Authorizing Provider   cephALEXin (KEFLEX) 500 mg capsule TK 4 CS PO 1 HOUR PRIOR TO DENTAL APPOINTMENT 5/8/18  Yes Historical Provider   pneumococcal 13 junie conj dip (PREVNAR-13) 0.5 mL syrg injection 0.5 mL by IntraMUSCular route once for 1 dose. 9/21/18 9/21/18 Yes Fabiola Cornea, NP   cholecalciferol, vitamin D3, (VITAMIN D3) 2,000 unit tab Take  by mouth. Yes Historical Provider   SIMBRINZA 1-0.2 % drps SHAKE LQ AND INT 1 GTT IN OU TID 11/2/17  Yes Historical Provider   losartan-hydroCHLOROthiazide (HYZAAR) 50-12.5 mg per tablet Take 0.5 Tabs by mouth daily.  For blood pressure 10/4/17  Yes Fabiola Cornea, NP   traMADol (ULTRAM) 50 mg tablet TAKE 1 TABLET BY MOUTH EVERY 8 HOURS AS NEEDED FOR PAIN 10/4/17  Yes Daniel Leblanc NP   Calcium Carbonate-Vit D3-Min 600 mg calcium- 400 unit tab Take 1 Tab by mouth two (2) times a day. For calcium and vitamin D 10/9/15  Yes Daniel Leblanc NP   aspirin (ASPIRIN) 325 mg tablet Take 325 mg by mouth daily. Yes Historical Provider   ibuprofen (ADVIL) 200 mg tablet Take 200 mg by mouth every eight (8) hours as needed for Pain. Yes Historical Provider   travoprost (TRAVATAN Z) 0.004 % ophthalmic solution Administer 1 Drop to both eyes every evening. Yes Historical Provider   dorzolamide (TRUSOPT) 2 % ophthalmic solution Administer 2 Drops to both eyes two (2) times a day. Indications: OPEN ANGLE GLAUCOMA   Yes Historical Provider   ALPHAGAN P 0.1 % Drop Apply 2 Drops to eye two (2) times a day. 2/25/11  Yes Historical Provider          Allergies   Allergen Reactions    Asmanex Twisthaler [Mometasone] Other (comments) and Nausea and Vomiting     Dizziness and mouth sores  Dizziness and mouth sores    Fosamax [Alendronate] Other (comments) and Nausea and Vomiting     Leg cramps  Leg cramps    Prednisone Anxiety           ROS  See HPI    Objective:   Physical Exam   Constitutional: She is oriented to person, place, and time. She appears well-developed and well-nourished. Neck: Normal range of motion. Neck supple. No JVD present. Carotid bruit is not present. No thyromegaly present. Cardiovascular: Normal rate, regular rhythm and intact distal pulses. Exam reveals no gallop and no friction rub. No murmur heard. Pulmonary/Chest: Effort normal and breath sounds normal. No respiratory distress. Musculoskeletal: She exhibits no edema. Lymphadenopathy:     She has no cervical adenopathy. Neurological: She is alert and oriented to person, place, and time. Psychiatric: She has a normal mood and affect. Her behavior is normal.   Nursing note and vitals reviewed.         Visit Vitals    /65 (BP 1 Location: Left arm, BP Patient Position: Sitting)    Pulse 66    Temp 97.9 °F (36.6 °C) (Oral)    Resp 17    Ht 5' 5\" (1.651 m)    Wt 165 lb (74.8 kg)    SpO2 97%    BMI 27.46 kg/m2       Assessment & Plan:  Diagnoses and all orders for this visit:    1. Essential hypertension  Well controlled, no medication changes.  -     METABOLIC PANEL, COMPREHENSIVE  -     TSH AND FREE T4  -     CBC WITH AUTOMATED DIFF    2. Primary osteoarthritis involving multiple joints  Tylenol PRN and Tramadol for severe symptoms. Referral to orthopedics for continued symptoms. 3. Stage 3 chronic kidney disease  Recheck labs, avoid NSAIDs  -     METABOLIC PANEL, COMPREHENSIVE  -     CBC WITH AUTOMATED DIFF    4. Mixed hyperlipidemia  Stable, no changes to current therapy    5. Vitamin D deficiency  -     VITAMIN D, 25 HYDROXY    6. Encounter for immunization  -     pneumococcal 13 junie conj dip (PREVNAR-13) 0.5 mL syrg injection; 0.5 mL by IntraMUSCular route once for 1 dose. I have discussed the diagnosis with the patient and the intended plan as seen in the above orders. The patient has received an after-visit summary along with patient information handout. I have discussed medication side effects and warnings with the patient as well. Follow-up Disposition:  Return in about 6 months (around 3/21/2019) for disease management.         Michelle Ashton NP

## 2018-09-21 NOTE — PROGRESS NOTES
Levi Key is a 80 y.o. female and presents for annual Medicare Wellness Visit. Problem List: Reviewed with patient and discussed risk factors. Patient Active Problem List   Diagnosis Code    DJD (degenerative joint disease) M19.90    HTN (hypertension) I10    AR (allergic rhinitis) J30.9    Glaucoma H40.9    TMJ (temporomandibular joint disorder) M26.609    Osteopenia M85.80    Mixed hyperlipidemia E78.2    ACP (advance care planning) Z71.89    Stage 3 chronic kidney disease N18.3       Current medical providers:  Patient Care Team:  Matilde Montes NP as PCP - General (Nurse Practitioner)  Grant Roe MD (Neurology)  Ace Vasquez MD (Cardiology)    PSH: Reviewed with patient  Past Surgical History:   Procedure Laterality Date    ENDOSCOPY, COLON, DIAGNOSTIC      HX APPENDECTOMY      HX HERNIA REPAIR      HX MOHS PROCEDURES      HX TUBAL LIGATION      HX WRIST FRACTURE [address]  08/16/13        SH: Reviewed with patient  Social History   Substance Use Topics    Smoking status: Former Smoker     Packs/day: 0.20     Years: 3.00    Smokeless tobacco: Never Used    Alcohol use No       FH: Reviewed with patient  Family History   Problem Relation Age of Onset    No Known Problems Mother     No Known Problems Father        Medications/Allergies: Reviewed with patient  Current Outpatient Prescriptions on File Prior to Visit   Medication Sig Dispense Refill    cholecalciferol, vitamin D3, (VITAMIN D3) 2,000 unit tab Take  by mouth.  SIMBRINZA 1-0.2 % drps SHAKE LQ AND INT 1 GTT IN OU TID  3    losartan-hydroCHLOROthiazide (HYZAAR) 50-12.5 mg per tablet Take 0.5 Tabs by mouth daily. For blood pressure 30 Tab 5    traMADol (ULTRAM) 50 mg tablet TAKE 1 TABLET BY MOUTH EVERY 8 HOURS AS NEEDED FOR PAIN 60 Tab 1    Calcium Carbonate-Vit D3-Min 600 mg calcium- 400 unit tab Take 1 Tab by mouth two (2) times a day.  For calcium and vitamin D 60 Tab prn    aspirin (ASPIRIN) 325 mg tablet Take 325 mg by mouth daily.  ibuprofen (ADVIL) 200 mg tablet Take 200 mg by mouth every eight (8) hours as needed for Pain.  travoprost (TRAVATAN Z) 0.004 % ophthalmic solution Administer 1 Drop to both eyes every evening.  dorzolamide (TRUSOPT) 2 % ophthalmic solution Administer 2 Drops to both eyes two (2) times a day. Indications: OPEN ANGLE GLAUCOMA      ALPHAGAN P 0.1 % Drop Apply 2 Drops to eye two (2) times a day. No current facility-administered medications on file prior to visit. Allergies   Allergen Reactions    Asmanex Twisthaler [Mometasone] Other (comments) and Nausea and Vomiting     Dizziness and mouth sores  Dizziness and mouth sores    Fosamax [Alendronate] Other (comments) and Nausea and Vomiting     Leg cramps  Leg cramps    Prednisone Anxiety       Objective:  Visit Vitals    /65 (BP 1 Location: Left arm, BP Patient Position: Sitting)    Pulse 66    Temp 97.9 °F (36.6 °C) (Oral)    Resp 17    Ht 5' 5\" (1.651 m)    Wt 165 lb (74.8 kg)    SpO2 97%    BMI 27.46 kg/m2    Body mass index is 27.46 kg/(m^2). Assessment of cognitive impairment: Alert and oriented x 3    Depression Screen:   PHQ over the last two weeks 9/21/2018   Little interest or pleasure in doing things Not at all   Feeling down, depressed, irritable, or hopeless Not at all   Total Score PHQ 2 0       Fall Risk Assessment:    Fall Risk Assessment, last 12 mths 9/21/2018   Able to walk? Yes   Fall in past 12 months? No   Fall with injury? -   Number of falls in past 12 months -   Fall Risk Score -       Functional Ability:   Does the patient exhibit a steady gait? yes   How long did it take the patient to get up and walk from a sitting position? 2 sec   Is the patient self reliant?  (ie can do own laundry, meals, household chores)  yes     Does the patient handle his/her own medications? yes     Does the patient handle his/her own money?    yes     Is the patients home safe (ie good lighting, handrails on stairs and bath, etc.)? yes     Did you notice or did patient express any hearing difficulties? no     Did you notice or did patient express any vision difficulties?   no     Were distance and reading eye charts used? no       Advance Care Planning:   Patient was offered the opportunity to discuss advance care planning:  yes     Does patient have an Advance Directive:  yes   If no, did you provide information on Caring Connections?  no       Plan:      Orders Placed This Encounter    METABOLIC PANEL, COMPREHENSIVE    TSH AND FREE T4    VITAMIN D, 25 HYDROXY    CBC WITH AUTOMATED DIFF    cephALEXin (KEFLEX) 500 mg capsule    pneumococcal 13 junie conj dip (PREVNAR-13) 0.5 mL syrg injection       Health Maintenance   Topic Date Due    Pneumococcal 65+ Low/Medium Risk (1 of 2 - PCV13) 09/10/1997    MEDICARE YEARLY EXAM  04/27/2018    Influenza Age 9 to Adult  08/01/2018    GLAUCOMA SCREENING Q2Y  08/22/2019    DTaP/Tdap/Td series (2 - Td) 08/04/2027    Bone Densitometry (Dexa) Screening  Addressed    ZOSTER VACCINE AGE 60>  Completed       *Patient verbalized understanding and agreement with the plan. A copy of the After Visit Summary with personalized health plan was given to the patient today.

## 2018-09-21 NOTE — PROGRESS NOTES
Chief Complaint   Patient presents with    Follow-up     follow up for blood pressure     1. Have you been to the ER, urgent care clinic since your last visit? Hospitalized since your last visit? No    2. Have you seen or consulted any other health care providers outside of the [address] [place] [address] since your last visit? Include any pap smears or colon screening.  No

## 2018-09-22 LAB
25(OH)D3+25(OH)D2 SERPL-MCNC: 29.4 NG/ML (ref 30–100)
ALBUMIN SERPL-MCNC: 4.3 G/DL (ref 3.5–4.7)
ALBUMIN/GLOB SERPL: 2.2 {RATIO} (ref 1.2–2.2)
ALP SERPL-CCNC: 80 IU/L (ref 39–117)
ALT SERPL-CCNC: 15 IU/L (ref 0–32)
AST SERPL-CCNC: 19 IU/L (ref 0–40)
BASOPHILS # BLD AUTO: 0 X10E3/UL (ref 0–0.2)
BASOPHILS NFR BLD AUTO: 0 %
BILIRUB SERPL-MCNC: 0.3 MG/DL (ref 0–1.2)
BUN SERPL-MCNC: 21 MG/DL (ref 8–27)
BUN/CREAT SERPL: 21 (ref 12–28)
CALCIUM SERPL-MCNC: 9.5 MG/DL (ref 8.7–10.3)
CHLORIDE SERPL-SCNC: 101 MMOL/L (ref 96–106)
CO2 SERPL-SCNC: 24 MMOL/L (ref 20–29)
CREAT SERPL-MCNC: 1.01 MG/DL (ref 0.57–1)
EOSINOPHIL # BLD AUTO: 0.3 X10E3/UL (ref 0–0.4)
EOSINOPHIL NFR BLD AUTO: 6 %
ERYTHROCYTE [DISTWIDTH] IN BLOOD BY AUTOMATED COUNT: 15.3 % (ref 12.3–15.4)
GLOBULIN SER CALC-MCNC: 2 G/DL (ref 1.5–4.5)
GLUCOSE SERPL-MCNC: 78 MG/DL (ref 65–99)
HCT VFR BLD AUTO: 36.9 % (ref 34–46.6)
HGB BLD-MCNC: 11.8 G/DL (ref 11.1–15.9)
IMM GRANULOCYTES # BLD: 0 X10E3/UL (ref 0–0.1)
IMM GRANULOCYTES NFR BLD: 0 %
INTERPRETATION: NORMAL
LYMPHOCYTES # BLD AUTO: 1.1 X10E3/UL (ref 0.7–3.1)
LYMPHOCYTES NFR BLD AUTO: 23 %
MCH RBC QN AUTO: 25.9 PG (ref 26.6–33)
MCHC RBC AUTO-ENTMCNC: 32 G/DL (ref 31.5–35.7)
MCV RBC AUTO: 81 FL (ref 79–97)
MONOCYTES # BLD AUTO: 0.4 X10E3/UL (ref 0.1–0.9)
MONOCYTES NFR BLD AUTO: 8 %
NEUTROPHILS # BLD AUTO: 3.2 X10E3/UL (ref 1.4–7)
NEUTROPHILS NFR BLD AUTO: 63 %
PLATELET # BLD AUTO: 207 X10E3/UL (ref 150–379)
POTASSIUM SERPL-SCNC: 3.9 MMOL/L (ref 3.5–5.2)
PROT SERPL-MCNC: 6.3 G/DL (ref 6–8.5)
RBC # BLD AUTO: 4.55 X10E6/UL (ref 3.77–5.28)
SODIUM SERPL-SCNC: 139 MMOL/L (ref 134–144)
T4 FREE SERPL-MCNC: 1.18 NG/DL (ref 0.82–1.77)
TSH SERPL DL<=0.005 MIU/L-ACNC: 1.92 UIU/ML (ref 0.45–4.5)
WBC # BLD AUTO: 5 X10E3/UL (ref 3.4–10.8)

## 2018-10-27 ENCOUNTER — OFFICE VISIT (OUTPATIENT)
Dept: FAMILY MEDICINE CLINIC | Age: 83
End: 2018-10-27

## 2018-10-27 VITALS
TEMPERATURE: 97.8 F | OXYGEN SATURATION: 95 % | DIASTOLIC BLOOD PRESSURE: 64 MMHG | HEART RATE: 69 BPM | SYSTOLIC BLOOD PRESSURE: 115 MMHG | HEIGHT: 65 IN | WEIGHT: 163 LBS | BODY MASS INDEX: 27.16 KG/M2 | RESPIRATION RATE: 16 BRPM

## 2018-10-27 DIAGNOSIS — J06.9 VIRAL UPPER RESPIRATORY TRACT INFECTION: Primary | ICD-10-CM

## 2018-10-27 RX ORDER — AZITHROMYCIN 250 MG/1
TABLET, FILM COATED ORAL
Qty: 6 TAB | Refills: 0 | Status: SHIPPED | OUTPATIENT
Start: 2018-10-27 | End: 2018-11-01

## 2018-10-27 RX ORDER — CODEINE PHOSPHATE AND GUAIFENESIN 10; 100 MG/5ML; MG/5ML
5 SOLUTION ORAL
Qty: 120 ML | Refills: 0 | Status: SHIPPED | OUTPATIENT
Start: 2018-10-27 | End: 2019-02-21 | Stop reason: ALTCHOICE

## 2018-10-27 NOTE — PROGRESS NOTES
[facility] Note    Raquel Acuna is a 80 y.o. female who was seen in clinic today (10/27/2018). Subjective:  Upper Respiratory Infection  Patient complains of symptoms of a URI. Symptoms include congestion and cough. Onset of symptoms was 2 days ago, gradually worsening since that time. She also c/o congestion, cough described as non-productive, without wheezing, dyspnea or hemoptysis, post nasal drip and sore throat for the past 2 days . She is drinking plenty of fluids. . Evaluation to date: none. Treatment to date: none. Prior to Admission medications    Medication Sig Start Date End Date Taking? Authorizing Provider   guaiFENesin-codeine (ROBITUSSIN AC) 100-10 mg/5 mL solution Take 5 mL by mouth three (3) times daily as needed for Cough. Max Daily Amount: 15 mL. 10/27/18  Yes Polly Newton NP   azithromycin (ZITHROMAX) 250 mg tablet Take 2 tablets today, then take 1 tablet daily 10/27/18 11/1/18 Yes Ruy Bhatia NP   cholecalciferol, vitamin D3, (VITAMIN D3) 2,000 unit tab Take  by mouth. Yes Provider, Historical   SIMBRINZA 1-0.2 % drps SHAKE LQ AND INT 1 GTT IN OU TID 11/2/17  Yes Provider, Historical   losartan-hydroCHLOROthiazide (HYZAAR) 50-12.5 mg per tablet Take 0.5 Tabs by mouth daily. For blood pressure 10/4/17  Yes Ruy Bhatia NP   traMADol (ULTRAM) 50 mg tablet TAKE 1 TABLET BY MOUTH EVERY 8 HOURS AS NEEDED FOR PAIN 10/4/17  Yes Ruy Bhatia NP   travoprost (TRAVATAN Z) 0.004 % ophthalmic solution Administer 1 Drop to both eyes every evening. Yes Provider, Historical   cephALEXin (KEFLEX) 500 mg capsule TK 4 CS PO 1 HOUR PRIOR TO DENTAL APPOINTMENT 5/8/18   Provider, Historical   Calcium Carbonate-Vit D3-Min 600 mg calcium- 400 unit tab Take 1 Tab by mouth two (2) times a day. For calcium and vitamin D 10/9/15   Polly Newton NP   aspirin (ASPIRIN) 325 mg tablet Take 325 mg by mouth daily.     Provider, Historical ibuprofen (ADVIL) 200 mg tablet Take 200 mg by mouth every eight (8) hours as needed for Pain. Provider, Historical   dorzolamide (TRUSOPT) 2 % ophthalmic solution Administer 2 Drops to both eyes two (2) times a day. Indications: OPEN ANGLE GLAUCOMA    Provider, Historical   ALPHAGAN P 0.1 % Drop Apply 2 Drops to eye two (2) times a day. 2/25/11   Provider, Historical          Allergies   Allergen Reactions    Asmanex Twisthaler [Mometasone] Other (comments) and Nausea and Vomiting     Dizziness and mouth sores  Dizziness and mouth sores    Fosamax [Alendronate] Other (comments) and Nausea and Vomiting     Leg cramps  Leg cramps    Prednisone Anxiety         ROS  See HPI    Objective:   Physical Exam   Constitutional: She is oriented to person, place, and time. She appears well-developed and well-nourished. No distress. HENT:   Right Ear: Tympanic membrane and ear canal normal.   Left Ear: Tympanic membrane and ear canal normal.   Nose: Mucosal edema present. Right sinus exhibits no maxillary sinus tenderness and no frontal sinus tenderness. Left sinus exhibits no maxillary sinus tenderness and no frontal sinus tenderness. Mouth/Throat: Oropharynx is clear and moist.   Cardiovascular: Normal rate, regular rhythm and normal heart sounds. No murmur heard. Pulmonary/Chest: Effort normal and breath sounds normal. She has no decreased breath sounds. She has no wheezes. She has no rhonchi. Lymphadenopathy:     She has no cervical adenopathy. Neurological: She is alert and oriented to person, place, and time. Psychiatric: She has a normal mood and affect. Her behavior is normal.   Nursing note and vitals reviewed. Visit Vitals  /64 (BP 1 Location: Left arm, BP Patient Position: Sitting)   Pulse 69   Temp 97.8 °F (36.6 °C) (Oral)   Resp 16   Ht 5' 5\" (1.651 m)   Wt 163 lb (73.9 kg)   SpO2 95%   BMI 27.12 kg/m²       Assessment & Plan:  Diagnoses and all orders for this visit:    1.  Viral upper respiratory tract infection  Discussed that symptoms were viral and recommended treating symptoms. Increase fluids and rest. Reviewed OTC products that patient could take.  -     guaiFENesin-codeine (ROBITUSSIN AC) 100-10 mg/5 mL solution; Take 5 mL by mouth three (3) times daily as needed for Cough. Max Daily Amount: 15 mL. -     Delay fill azithromycin (ZITHROMAX) 250 mg tablet; Take 2 tablets today, then take 1 tablet daily      I have discussed the diagnosis with the patient and the intended plan as seen in the above orders. The patient has received an after-visit summary along with patient information handout. I have discussed medication side effects and warnings with the patient as well. Follow-up Disposition:  Return if symptoms worsen or fail to improve.         Nury Arrington NP

## 2018-10-27 NOTE — PATIENT INSTRUCTIONS

## 2019-02-05 ENCOUNTER — TELEPHONE (OUTPATIENT)
Dept: FAMILY MEDICINE CLINIC | Age: 84
End: 2019-02-05

## 2019-02-05 NOTE — TELEPHONE ENCOUNTER
----- Message from Foreign Rodriguez sent at 2/5/2019  4:12 PM EST -----  Regarding: Jannette FARLEY/Telephone   Pt is requesting a call back regarding getting a letter in the mail from her pharmacy stating a Urgent Recall Message regarding her B/P Rx. She would like to hear back from the practice by the end of the day. Best contact is [phone].

## 2019-02-06 RX ORDER — LOSARTAN POTASSIUM 50 MG/1
50 TABLET ORAL DAILY
Qty: 30 TAB | Refills: 5 | Status: SHIPPED | OUTPATIENT
Start: 2019-02-06 | End: 2019-02-06 | Stop reason: SDUPTHER

## 2019-02-06 NOTE — TELEPHONE ENCOUNTER
[phone] attempted to call patient no answer no vm will try to call her back    [phone] Yue called to verify if patients medication Losartan/Hctz is being recalled spoke to pharmacist they're not sure if her medication was one of the lot number for recalled we can send something else

## 2019-02-07 ENCOUNTER — TELEPHONE (OUTPATIENT)
Dept: FAMILY MEDICINE CLINIC | Age: 84
End: 2019-02-07

## 2019-02-07 NOTE — TELEPHONE ENCOUNTER
[phone] spoke to notified that Losartan without HCTZ was the new medication for her patient understand

## 2019-02-07 NOTE — TELEPHONE ENCOUNTER
Pt. is calling requesting a call back regarding to the BP medication that was called in to the pharm today. Pt. Chalmers Schaumann the new BP meds does not include HCT in it and was told by jose at the pharmacy to ask her doctor about that before she take it.      Best call #[phone]

## 2019-02-21 ENCOUNTER — OFFICE VISIT (OUTPATIENT)
Dept: FAMILY MEDICINE CLINIC | Age: 84
End: 2019-02-21

## 2019-02-21 VITALS
OXYGEN SATURATION: 96 % | TEMPERATURE: 97.6 F | DIASTOLIC BLOOD PRESSURE: 73 MMHG | HEART RATE: 70 BPM | SYSTOLIC BLOOD PRESSURE: 132 MMHG | BODY MASS INDEX: 26.46 KG/M2 | RESPIRATION RATE: 18 BRPM | WEIGHT: 158.8 LBS | HEIGHT: 65 IN

## 2019-02-21 DIAGNOSIS — H61.22 IMPACTED CERUMEN, LEFT EAR: ICD-10-CM

## 2019-02-21 DIAGNOSIS — G56.03 BILATERAL CARPAL TUNNEL SYNDROME: ICD-10-CM

## 2019-02-21 DIAGNOSIS — M85.89 OSTEOPENIA OF MULTIPLE SITES: ICD-10-CM

## 2019-02-21 DIAGNOSIS — I10 ESSENTIAL HYPERTENSION: Primary | ICD-10-CM

## 2019-02-21 DIAGNOSIS — N18.30 STAGE 3 CHRONIC KIDNEY DISEASE (HCC): ICD-10-CM

## 2019-02-21 NOTE — PATIENT INSTRUCTIONS
Carpal Tunnel Release: Before Your Surgery  What is carpal tunnel release? Carpal tunnel release is surgery that reduces the pressure on a nerve in the wrist. Your doctor will cut a ligament that presses on the nerve. This lets the nerve pass freely through the tunnel without being squeezed. The surgery can be open or endoscopic. In open surgery, your doctor makes a small cut in the palm of your hand. This cut is called an incision. In endoscopic surgery, your doctor makes one small incision in the wrist, or one small incision in the wrist and one in the palm. Your doctor puts a thin tube with a camera attached (endoscope) into the incision. Surgical tools are put in along with the endoscope. In both types of surgeries, the incisions are closed with stitches. The incisions leave scars that usually fade in time. You may be asleep during the surgery. Or you may be awake and have medicine to numb your hand and arm so you will not feel pain. After surgery, your wrist and hand pain should begin to go away. It usually takes 3 to 4 months to recover and 1 year before your hand strength returns. How much hand strength returns is different for each person. You will go home the same day as the surgery. When you can return to work depends on the type of work you do. Follow-up care is a key part of your treatment and safety. Be sure to make and go to all appointments, and call your doctor if you are having problems. It's also a good idea to know your test results and keep a list of the medicines you take. What happens before surgery?   Surgery can be stressful. This information will help you understand what you can expect. And it will help you safely prepare for surgery.   Preparing for surgery    · Understand exactly what surgery is planned, along with the risks, benefits, and other options. · Tell your doctors ALL the medicines, vitamins, supplements, and herbal remedies you take.  Some of these can increase the risk of bleeding or interact with anesthesia.     · If you take blood thinners, such as warfarin (Coumadin), clopidogrel (Plavix), or aspirin, be sure to talk to your doctor. He or she will tell you if you should stop taking these medicines before your surgery. Make sure that you understand exactly what your doctor wants you to do.     · Your doctor will tell you which medicines to take or stop before your surgery. You may need to stop taking certain medicines a week or more before surgery. So talk to your doctor as soon as you can.     · If you have an advance directive, let your doctor know. It may include a living will and a durable power of  for health care. Bring a copy to the hospital. If you don't have one, you may want to prepare one. It lets your doctor and loved ones know your health care wishes. Doctors advise that everyone prepare these papers before any type of surgery or procedure. What happens on the day of surgery? · Follow the instructions exactly about when to stop eating and drinking. If you don't, your surgery may be canceled. If your doctor told you to take your medicines on the day of surgery, take them with only a sip of water.     · Take a bath or shower before you come in for your surgery. Do not apply lotions, perfumes, deodorants, or nail polish.     · Do not shave the surgical site yourself.     · Take off all jewelry and piercings. And take out contact lenses, if you wear them.    At the hospital or surgery center   · Bring a picture ID.     · The area for surgery is often marked to make sure there are no errors.     · You will be kept comfortable and safe by your anesthesia provider. The anesthesia may make you sleep. Or it may just numb the area being worked on.     · The surgery will take about 15 to 60 minutes. Going home   · Be sure you have someone to drive you home.  Anesthesia and pain medicine make it unsafe for you to drive.     · You will be given more specific instructions about recovering from your surgery. They will cover things like diet, wound care, follow-up care, driving, and getting back to your normal routine. When should you call your doctor? · You have questions or concerns.     · You don't understand how to prepare for your surgery.     · You become ill before the surgery (such as fever, flu, or a cold).     · You need to reschedule or have changed your mind about having the surgery. Where can you learn more? Go to http://mehreen-grzegorz.info/. Enter D018 in the search box to learn more about \"Carpal Tunnel Release: Before Your Surgery. \"  Current as of: September 20, 2018  Content Version: 11.9  © 6297-5850 [employer]. Care instructions adapted under license by [employer] (which disclaims liability or warranty for this information). If you have questions about a medical condition or this instruction, always ask your healthcare professional. [address] any warranty or liability for your use of this information.

## 2019-02-21 NOTE — PROGRESS NOTES
No chief complaint on file. Reviewed Record in preparation for visit and have obtained necessary documentation. Identified pt with two pt identifiers (Name @ )    Health Maintenance Due   Topic    Shingrix Vaccine Age 50> (1 of 2)    Pneumococcal 65+ Low/Medium Risk (1 of 2 - PCV13)    Influenza Age 5 to Adult          1. Have you been to the ER, urgent care clinic since your last visit? Hospitalized since your last visit? no    2. Have you seen or consulted any other health care providers outside of the [address] [place] [address] since your last visit? Include any pap smears or colon screening.  no

## 2019-02-21 NOTE — PROGRESS NOTES
[facility] Note    Latesha Phelps is a 80 y.o. female who was seen in clinic today (2/23/2019). Subjective:  Cardiovascular Review:  The patient in for follow up of hypertension. Taking Losartan 50 mg daily with adequate control. Home readings: 140/80s  Diet and Lifestyle: generally follows a low fat low cholesterol diet, generally follows a low sodium diet, sedentary, nonsmoker  Pertinent ROS: taking medications as instructed, no medication side effects noted, no TIA's, no chest pain on exertion, no swelling of ankles. Osteoarthritis and Chronic Pain:  Patient has osteoarthritis, primarily affecting the neck and back. Symptoms onset: problem is longstanding. Rheumatological ROS:  Using Aspirin daily and  Tramadol intermittently. Response to treatment plan: symptoms have progressed to a point and plateaued. Reports continued burning and tingling in bilateral arms. Reports diminished  strength at times. Seen by neurology, Dr. Lizz Conley and advised that she had carpal tunnel syndrome. EMG (5/2018) showed bilateral severe CTS. Ear Pain  Patient complains of plugged sensation in left ear. Onset of symptoms was a few weeks ago, unchanged since that time. She does not have a history of ear infections. No home therapies. Prior to Admission medications    Medication Sig Start Date End Date Taking? Authorizing Provider   losartan (COZAAR) 50 mg tablet TAKE 1 TABLET BY MOUTH DAILY FOR BLOOD PRESSURE 2/6/19  Yes Aide Bhatia, MIGUELITO   cholecalciferol, vitamin D3, (VITAMIN D3) 2,000 unit tab Take  by mouth. Yes Provider, Historical   SIMBRINZA 1-0.2 % drps SHAKE LQ AND INT 1 GTT IN OU TID 11/2/17  Yes Provider, Historical   traMADol (ULTRAM) 50 mg tablet TAKE 1 TABLET BY MOUTH EVERY 8 HOURS AS NEEDED FOR PAIN 10/4/17  Yes Aide Bhatia NP   ibuprofen (ADVIL) 200 mg tablet Take 200 mg by mouth every eight (8) hours as needed for Pain.    Yes Provider, Historical   travoprost (TRAVATAN Z) 0.004 % ophthalmic solution Administer 1 Drop to both eyes every evening. Yes Provider, Historical   ALPHAGAN P 0.1 % Drop Apply 2 Drops to eye two (2) times a day. 2/25/11  Yes Provider, Historical          Allergies   Allergen Reactions    Asmanex Twisthaler [Mometasone] Other (comments) and Nausea and Vomiting     Dizziness and mouth sores  Dizziness and mouth sores    Fosamax [Alendronate] Other (comments) and Nausea and Vomiting     Leg cramps  Leg cramps    Prednisone Anxiety           ROS  See HPI    Objective:   Physical Exam   Constitutional: She is oriented to person, place, and time. She appears well-developed and well-nourished. HENT:   Left Ear: A foreign body (hardened cerumen in canal) is present. Neck: Normal range of motion. Neck supple. No JVD present. Carotid bruit is not present. No thyromegaly present. Cardiovascular: Normal rate, regular rhythm and intact distal pulses. Exam reveals no gallop and no friction rub. No murmur heard. Pulmonary/Chest: Effort normal and breath sounds normal. No respiratory distress. Musculoskeletal: She exhibits no edema. Lymphadenopathy:     She has no cervical adenopathy. Neurological: She is alert and oriented to person, place, and time. Psychiatric: She has a normal mood and affect. Her behavior is normal.   Nursing note and vitals reviewed. Visit Vitals  /73 (BP 1 Location: Right arm, BP Patient Position: Sitting)   Pulse 70   Temp 97.6 °F (36.4 °C) (Oral)   Resp 18   Ht 5' 5\" (1.651 m)   Wt 158 lb 12.8 oz (72 kg)   SpO2 96%   BMI 26.43 kg/m²       Assessment & Plan:  Diagnoses and all orders for this visit:    1. Essential hypertension  Well controlled, no medication changes. 2. Stage 3 chronic kidney disease (HCC)  Stable, no changes to current therapy    3. Bilateral carpal tunnel syndrome  Recommended follow up with hand specialist.   -     REFERRAL TO ORTHOPEDIC SURGERY    4. Osteopenia of multiple sites  DEXA deferred by patient. 5. Impacted cerumen, left ear  Removal in clinic deferred. Reviewed OTC ear wax removal. Referral to ENT for continued symptoms. I have discussed the diagnosis with the patient and the intended plan as seen in the above orders. The patient has received an after-visit summary along with patient information handout. I have discussed medication side effects and warnings with the patient as well. Follow-up Disposition:  Return in about 6 months (around 8/21/2019) for blood pressure, disease management.         Jerome Osman, MIGUELITO

## 2019-02-25 ENCOUNTER — OFFICE VISIT (OUTPATIENT)
Dept: FAMILY MEDICINE CLINIC | Age: 84
End: 2019-02-25

## 2019-02-25 VITALS
HEART RATE: 73 BPM | DIASTOLIC BLOOD PRESSURE: 72 MMHG | BODY MASS INDEX: 26.46 KG/M2 | OXYGEN SATURATION: 95 % | HEIGHT: 65 IN | WEIGHT: 158.8 LBS | RESPIRATION RATE: 16 BRPM | SYSTOLIC BLOOD PRESSURE: 110 MMHG | TEMPERATURE: 98.1 F

## 2019-02-25 DIAGNOSIS — H61.22 IMPACTED CERUMEN OF LEFT EAR: ICD-10-CM

## 2019-02-25 DIAGNOSIS — J06.9 VIRAL URI WITH COUGH: Primary | ICD-10-CM

## 2019-02-25 NOTE — PROGRESS NOTES
Kenzie Verduzco is a 80 y.o. female    Chief Complaint   Patient presents with    Cough     Pt c/o cough and chest congestion starting a week ago. 1. Have you been to the ER, urgent care clinic since your last visit? Hospitalized since your last visit? No     2. Have you seen or consulted any other health care providers outside of the [address] [place] [address] since your last visit? Include any pap smears or colon screening.   No     Health Maintenance Due   Topic Date Due    Shingrix Vaccine Age 49> (1 of 2) 09/10/1982    Pneumococcal 65+ Low/Medium Risk (1 of 2 - PCV13) 09/10/1997       Visit Vitals  /72 (BP 1 Location: Left arm, BP Patient Position: Sitting)   Pulse 73   Temp 98.1 °F (36.7 °C) (Oral)   Resp 16   Ht 5' 5\" (1.651 m)   Wt 158 lb 12.8 oz (72 kg)   SpO2 95%   BMI 26.43 kg/m²

## 2019-02-25 NOTE — PROGRESS NOTES
[facility] Note      Subjective:     Chief Complaint   Patient presents with    Cough     Pt c/o cough and chest congestion starting a week ago. Shubham William is a 80y.o. year old female who presents for evaluation of the following:    Cough: Onset 1 week ago, the evening of her last visit with PCP  Taking cough syrup from old prescription with some relief  Daughter-in-law states she had some audible wheezing heard over the phone. Denies vomiting, chest pain, shortness of breath, fever    Cerumen impaction:  He has earwax in the left ear on her last visit. States ear pain persists. Review of Systems   Pertinent positives and negative per HPI. All other systems  reviewed are negative for a Comprehensive ROS (10+).        Past Medical History:   Diagnosis Date    AR (allergic rhinitis) 5/4/2010    Asthmatic bronchitis 5/4/2010    DJD (degenerative joint disease) 5/4/2010    Glaucoma 5/4/2010    HTN (hypertension) 5/4/2010    Mixed hyperlipidemia     normal particle number 4/2012    Osteopenia 5/4/2010    TMJ (temporomandibular joint disorder) 5/4/2010    Viral labyrinthitis 5/4/2010        Social History     Socioeconomic History    Marital status:      Spouse name: Not on file    Number of children: Not on file    Years of education: Not on file    Highest education level: Not on file   Social Needs    Financial resource strain: Not on file    Food insecurity - worry: Not on file    Food insecurity - inability: Not on file   [employer] needs - medical: Not on file   [employer] needs - non-medical: Not on file   Occupational History    Not on file   Tobacco Use    Smoking status: Former Smoker     Packs/day: 0.20     Years: 3.00     Pack years: 0.60    Smokeless tobacco: Never Used   Substance and Sexual Activity    Alcohol use: No    Drug use: No    Sexual activity: Not Currently     Partners: Male   Other Topics Concern   Service No    Blood Transfusions No    Caffeine Concern No    Occupational Exposure No    Hobby Hazards No    Sleep Concern Yes     Comment: family health issues    Stress Concern Yes    Weight Concern No    Special Diet No    Back Care No    Exercise No    Bike Helmet No    Seat Belt Yes    Self-Exams Yes   Social History Narrative    Not on file       Current Outpatient Medications   Medication Sig    losartan (COZAAR) 50 mg tablet TAKE 1 TABLET BY MOUTH DAILY FOR BLOOD PRESSURE    cholecalciferol, vitamin D3, (VITAMIN D3) 2,000 unit tab Take  by mouth.  SIMBRINZA 1-0.2 % drps SHAKE LQ AND INT 1 GTT IN OU TID    traMADol (ULTRAM) 50 mg tablet TAKE 1 TABLET BY MOUTH EVERY 8 HOURS AS NEEDED FOR PAIN    ibuprofen (ADVIL) 200 mg tablet Take 200 mg by mouth every eight (8) hours as needed for Pain.  travoprost (TRAVATAN Z) 0.004 % ophthalmic solution Administer 1 Drop to both eyes every evening.  ALPHAGAN P 0.1 % Drop Apply 2 Drops to eye two (2) times a day. No current facility-administered medications for this visit. Objective:     Vitals:    02/25/19 1132   BP: 110/72   Pulse: 73   Resp: 16   Temp: 98.1 °F (36.7 °C)   TempSrc: Oral   SpO2: 95%   Weight: 158 lb 12.8 oz (72 kg)   Height: 5' 5\" (1.651 m)       Physical Examination:  General: Alert, cooperative, no distress, appears stated age. Eyes: Conjunctivae clear. PERRL, EOMs intact. Ears: TM clear on right. Left external ear with cerumen impaction. , manually disimpacted with clear TMs. Nose: Nares normal. Septum midline. Mucosa normal. No drainage or sinus tenderness.  -Audible nasal congestion  Mouth/Throat: Lips, mucosa, and tongue normal.   Neck: Supple, symmetrical, trachea midline, no adenopathy. No thyroid enlargement/tenderness/nodules  Lungs: Clear to auscultation bilaterally. Normal inspiratory and expiratory ratio.    Heart: Regular rate and rhythm, S1, S2 normal, no murmur, click, rub or gallop. Extremities: Extremities normal, atraumatic, no cyanosis or edema. Skin: Skin color, texture, turgor normal. No rashes or lesions on exposed skin. Lymph nodes: Cervical, supraclavicular nodes normal.  Neurologic: CNII-XII intact. No visits with results within 3 Month(s) from this visit. Latest known visit with results is:   Office Visit on 09/21/2018   Component Date Value Ref Range Status    Glucose 09/21/2018 78  65 - 99 mg/dL Final    BUN 09/21/2018 21  8 - 27 mg/dL Final    Creatinine 09/21/2018 1.01* 0.57 - 1.00 mg/dL Final    GFR est non-AA 09/21/2018 51* >59 mL/min/1.73 Final    GFR est AA 09/21/2018 58* >59 mL/min/1.73 Final    BUN/Creatinine ratio 09/21/2018 21  12 - 28 Final    Sodium 09/21/2018 139  134 - 144 mmol/L Final    Potassium 09/21/2018 3.9  3.5 - 5.2 mmol/L Final    Chloride 09/21/2018 101  96 - 106 mmol/L Final    CO2 09/21/2018 24  20 - 29 mmol/L Final    Calcium 09/21/2018 9.5  8.7 - 10.3 mg/dL Final    Protein, total 09/21/2018 6.3  6.0 - 8.5 g/dL Final    Albumin 09/21/2018 4.3  3.5 - 4.7 g/dL Final    GLOBULIN, TOTAL 09/21/2018 2.0  1.5 - 4.5 g/dL Final    A-G Ratio 09/21/2018 2.2  1.2 - 2.2 Final    Bilirubin, total 09/21/2018 0.3  0.0 - 1.2 mg/dL Final    Alk. phosphatase 09/21/2018 80  39 - 117 IU/L Final    AST (SGOT) 09/21/2018 19  0 - 40 IU/L Final    ALT (SGPT) 09/21/2018 15  0 - 32 IU/L Final    TSH 09/21/2018 1.920  0.450 - 4.500 uIU/mL Final    T4, Free 09/21/2018 1.18  0.82 - 1.77 ng/dL Final    VITAMIN D, 25-HYDROXY 09/21/2018 29.4* 30.0 - 100.0 ng/mL Final    Comment: Vitamin D deficiency has been defined by the [facility] practice guideline as a  level of serum 25-OH vitamin D less than 20 ng/mL (1,2). The Endocrine Society went on to further define vitamin D  insufficiency as a level between 21 and 29 ng/mL (2). 1. IOM ([employer] of [employer]). 2010.  Dietary reference     intakes for calcium and D. [facility]: The     [employer]. 2. Latrell MF, Verito NC, Destiny COSBY, et al.     Evaluation, treatment, and prevention of vitamin D     deficiency: an Endocrine Society clinical practice     guideline. JCEM. 2011 Jul; 96(7):1911-30.  WBC 09/21/2018 5.0  3.4 - 10.8 x10E3/uL Final    RBC 09/21/2018 4.55  3.77 - 5.28 x10E6/uL Final    HGB 09/21/2018 11.8  11.1 - 15.9 g/dL Final    HCT 09/21/2018 36.9  34.0 - 46.6 % Final    MCV 09/21/2018 81  79 - 97 fL Final    MCH 09/21/2018 25.9* 26.6 - 33.0 pg Final    MCHC 09/21/2018 32.0  31.5 - 35.7 g/dL Final    RDW 09/21/2018 15.3  12.3 - 15.4 % Final    PLATELET 88/05/9466 625  150 - 379 x10E3/uL Final    NEUTROPHILS 09/21/2018 63  Not Estab. % Final    Lymphocytes 09/21/2018 23  Not Estab. % Final    MONOCYTES 09/21/2018 8  Not Estab. % Final    EOSINOPHILS 09/21/2018 6  Not Estab. % Final    BASOPHILS 09/21/2018 0  Not Estab. % Final    ABS. NEUTROPHILS 09/21/2018 3.2  1.4 - 7.0 x10E3/uL Final    Abs Lymphocytes 09/21/2018 1.1  0.7 - 3.1 x10E3/uL Final    ABS. MONOCYTES 09/21/2018 0.4  0.1 - 0.9 x10E3/uL Final    ABS. EOSINOPHILS 09/21/2018 0.3  0.0 - 0.4 x10E3/uL Final    ABS. BASOPHILS 09/21/2018 0.0  0.0 - 0.2 x10E3/uL Final    IMMATURE GRANULOCYTES 09/21/2018 0  Not Estab. % Final    ABS. IMM. GRANS. 09/21/2018 0.0  0.0 - 0.1 x10E3/uL Final    Interpretation 09/21/2018 Note   Final    Supplemental report is available. Assessment/ Plan:   Diagnoses and all orders for this visit:    1. Viral URI with cough    2. Impacted cerumen of left ear  -     REMOVE IMPACTED EAR WAX         Mild URI/sinusitis  No SIRS. May continue cough syrup provided by PCP in the past.  Trial of otc meds for symptom relief discussed and listed in patient instructions- nasal steroid + mucinex + antihistamine + sinus rinse + otc analgesia + humidifier prn    Cerumen disimpacted manually with a resolution of ear pain.       Educated patient on red flag symptoms to warrant return to clinic or emergency room visit. I have discussed the diagnosis with the patient and the intended plan as seen in the above orders. The patient has been offered or received an after-visit summary and questions were answered concerning future plans. I have discussed medication side effects and warnings with the patient as well. Follow-up Disposition:  Return if symptoms worsen or fail to improve.       Signed,    Adrien Banuelos MD  2/25/2019

## 2019-02-25 NOTE — PATIENT INSTRUCTIONS
For your symptoms: Your symptoms may improve with an oral antihistamine. These are available over the counter and include:  Loratadine/claritin  Cetirizine/Zyrtec  Fexofenadine/Allegra  Levocetirizine/Xyzal    · Your symptoms may improve with a nasal steroid. These are available over the counter and include:  · Flonase (aka fluticasone)  · Nasocort (aka triamcinolone)  · Nasonex (aka mometasone)  · Rhinocort (aka budesonide)    · Increase fluid intake, especially water to thin mucous and boost the immune system. · Avoid sugar and dairy while congested since they thicken mucous. · Get plenty of rest!    · Gargle 3 times daily and as needed in Listerine or warm salt water vinegar solutions (1 tsp salt, 1 tsp vinegar in 1 cup lukewarm water.)    · Use OTC nasal saline spray up each nostril four times daily. You could also consider using a netipot with distilled water. · Use humidifier at bedtime. · Use OTC Mucinex 600 mg twice daily to loosen mucous. · Use OTC Tylenol  (up to 650mg every 6 hours) or Ibuprofen (up to 800 mg every 8 hours) as needed for pain, fever or headaches. ·  Avoid decongestants and Ibuprofen if you have high blood pressure! Return to the doctor for evaluation:  · If mucous is consistently discolored yellow or green throughout the day for more than a week  · If you develop worsening facial pain  · If you develop a fever that will not go away  · If your symptoms worsen instead of improve             Saline Nasal Washes: Care Instructions  Your Care Instructions  Saline nasal washes help keep the nasal passages open by washing out thick or dried mucus. This simple remedy can help relieve symptoms of allergies, sinusitis, and colds. It also can make the nose feel more comfortable by keeping the mucous membranes moist. You may notice a little burning sensation in your nose the first few times you use the solution, but this usually gets better in a few days.   Follow-up care is a key part of your treatment and safety. Be sure to make and go to all appointments, and call your doctor if you are having problems. It's also a good idea to know your test results and keep a list of the medicines you take. How can you care for yourself at home? · You can buy premixed saline solution in a squeeze bottle or other sinus rinse products at a drugstore. Read and follow the instructions on the label. · You also can make your own saline solution by adding 1 teaspoon of salt and 1 teaspoon of baking soda to 2 cups of distilled water. · If you use a homemade solution, pour a small amount into a clean bowl. Using a rubber bulb syringe, squeeze the syringe and place the tip in the salt water. Pull a small amount of the salt water into the syringe by relaxing your hand. · Sit down with your head tilted slightly back. Do not lie down. Put the tip of the bulb syringe or the squeeze bottle a little way into one of your nostrils. Gently drip or squirt a few drops into the nostril. Repeat with the other nostril. Some sneezing and gagging are normal at first.  · Gently blow your nose. · Wipe the syringe or bottle tip clean after each use. · Repeat this 2 or 3 times a day. · Use nasal washes gently if you have nosebleeds often. When should you call for help? Watch closely for changes in your health, and be sure to contact your doctor if:    · You often get nosebleeds.     · You have problems doing the nasal washes. Where can you learn more? Go to http://mehreen-grzegorz.info/. Enter [id number] [id number] [id number] in the search box to learn more about \"Saline Nasal Washes: Care Instructions. \"  Current as of: March 27, 2018  Content Version: 11.9  © 4506-6446 [employer]. Care instructions adapted under license by [facility] (which disclaims liability or warranty for this information).  If you have questions about a medical condition or this instruction, always ask your healthcare paul. [address] any warranty or liability for your use of this information. Saline Nasal Washes: Care Instructions  Your Care Instructions  Saline nasal washes help keep the nasal passages open by washing out thick or dried mucus. This simple remedy can help relieve symptoms of allergies, sinusitis, and colds. It also can make the nose feel more comfortable by keeping the mucous membranes moist. You may notice a little burning sensation in your nose the first few times you use the solution, but this usually gets better in a few days. Follow-up care is a key part of your treatment and safety. Be sure to make and go to all appointments, and call your doctor if you are having problems. It's also a good idea to know your test results and keep a list of the medicines you take. How can you care for yourself at home? · You can buy premixed saline solution in a squeeze bottle or other sinus rinse products at a drugstore. Read and follow the instructions on the label. · You also can make your own saline solution by adding 1 teaspoon of salt and 1 teaspoon of baking soda to 2 cups of distilled water. · If you use a homemade solution, pour a small amount into a clean bowl. Using a rubber bulb syringe, squeeze the syringe and place the tip in the salt water. Pull a small amount of the salt water into the syringe by relaxing your hand. · Sit down with your head tilted slightly back. Do not lie down. Put the tip of the bulb syringe or the squeeze bottle a little way into one of your nostrils. Gently drip or squirt a few drops into the nostril. Repeat with the other nostril. Some sneezing and gagging are normal at first.  · Gently blow your nose. · Wipe the syringe or bottle tip clean after each use. · Repeat this 2 or 3 times a day. · Use nasal washes gently if you have nosebleeds often. When should you call for help?   Watch closely for changes in your health, and be sure to contact your doctor if:    · You often get nosebleeds.     · You have problems doing the nasal washes. Where can you learn more? Go to http://mehreen-grzegorz.info/. Enter [id number] in the search box to learn more about \"Saline Nasal Washes: Care Instructions. \"  Current as of: March 27, 2018  Content Version: 11.9  © 8271-5835 [employer]. Care instructions adapted under license by [employer] (which disclaims liability or warranty for this information). If you have questions about a medical condition or this instruction, always ask your healthcare professional. [place] [address] any warranty or liability for your use of this information. Upper Respiratory Infection (Cold): Care Instructions  Your Care Instructions    An upper respiratory infection, or URI, is an infection of the nose, sinuses, or throat. URIs are spread by coughs, sneezes, and direct contact. The common cold is the most frequent kind of URI. The flu and sinus infections are other kinds of URIs. Almost all URIs are caused by viruses. Antibiotics won't cure them. But you can treat most infections with home care. This may include drinking lots of fluids and taking over-the-counter pain medicine. You will probably feel better in 4 to 10 days. The doctor has checked you carefully, but problems can develop later. If you notice any problems or new symptoms, get medical treatment right away. Follow-up care is a key part of your treatment and safety. Be sure to make and go to all appointments, and call your doctor if you are having problems. It's also a good idea to know your test results and keep a list of the medicines you take. How can you care for yourself at home? · To prevent dehydration, drink plenty of fluids, enough so that your urine is light yellow or clear like water. Choose water and other caffeine-free clear liquids until you feel better.  If you have kidney, heart, or liver disease and have to limit fluids, talk with your doctor before you increase the amount of fluids you drink. · Take an over-the-counter pain medicine, such as acetaminophen (Tylenol), ibuprofen (Advil, Motrin), or naproxen (Aleve). Read and follow all instructions on the label. · Before you use cough and cold medicines, check the label. These medicines may not be safe for young children or for people with certain health problems. · Be careful when taking over-the-counter cold or flu medicines and Tylenol at the same time. Many of these medicines have acetaminophen, which is Tylenol. Read the labels to make sure that you are not taking more than the recommended dose. Too much acetaminophen (Tylenol) can be harmful. · Get plenty of rest.  · Do not smoke or allow others to smoke around you. If you need help quitting, talk to your doctor about stop-smoking programs and medicines. These can increase your chances of quitting for good. When should you call for help? Call 911 anytime you think you may need emergency care. For example, call if:    · You have severe trouble breathing.    Call your doctor now or seek immediate medical care if:    · You seem to be getting much sicker.     · You have new or worse trouble breathing.     · You have a new or higher fever.     · You have a new rash.    Watch closely for changes in your health, and be sure to contact your doctor if:    · You have a new symptom, such as a sore throat, an earache, or sinus pain.     · You cough more deeply or more often, especially if you notice more mucus or a change in the color of your mucus.     · You do not get better as expected. Where can you learn more? Go to http://mehreen-grzegorz.info/. Enter A280 in the search box to learn more about \"Upper Respiratory Infection (Cold): Care Instructions. \"  Current as of: September 5, 2018  Content Version: 11.9  © 2393-1001 [employer].  Care instructions adapted under license by [address] (which disclaims liability or warranty for this information). If you have questions about a medical condition or this instruction, always ask your healthcare professional. [address] any warranty or liability for your use of this information.

## 2019-03-05 ENCOUNTER — OFFICE VISIT (OUTPATIENT)
Dept: FAMILY MEDICINE CLINIC | Age: 84
End: 2019-03-05

## 2019-03-05 VITALS
DIASTOLIC BLOOD PRESSURE: 59 MMHG | RESPIRATION RATE: 18 BRPM | OXYGEN SATURATION: 97 % | SYSTOLIC BLOOD PRESSURE: 120 MMHG | HEIGHT: 65 IN | BODY MASS INDEX: 26.23 KG/M2 | TEMPERATURE: 97.9 F | WEIGHT: 157.4 LBS | HEART RATE: 70 BPM

## 2019-03-05 DIAGNOSIS — H61.22 IMPACTED CERUMEN OF LEFT EAR: ICD-10-CM

## 2019-03-05 DIAGNOSIS — J40 BRONCHITIS: Primary | ICD-10-CM

## 2019-03-05 RX ORDER — AZITHROMYCIN 250 MG/1
TABLET, FILM COATED ORAL
Qty: 6 TAB | Refills: 0 | Status: SHIPPED | OUTPATIENT
Start: 2019-03-05 | End: 2019-03-10

## 2019-03-05 NOTE — PROGRESS NOTES
[facility] Note    Jami Gastelum is a 80 y.o. female who was seen in clinic today (3/5/2019). Subjective:  Upper Respiratory Infection  Patient complains of symptoms of a URI. Symptoms include congestion and cough. Onset of symptoms was 10 days ago, unchanged since that time. She also c/o congestion, cough described as productive of clear sputum, chest is painful during coughing and shortness of breath for the past 10 days. She is drinking plenty of fluids. Evaluation to date: none. Treatment to date: Robitussin AC. Patient also reports pressure of left ear with coughing. Prior to Admission medications    Medication Sig Start Date End Date Taking? Authorizing Provider   azithromycin (ZITHROMAX) 250 mg tablet Take 2 tablets today, then take 1 tablet daily 3/5/19 3/10/19 Yes Stone Bhatia NP   losartan (COZAAR) 50 mg tablet TAKE 1 TABLET BY MOUTH DAILY FOR BLOOD PRESSURE 2/6/19  Yes Stone Bhatia NP   cholecalciferol, vitamin D3, (VITAMIN D3) 2,000 unit tab Take  by mouth. Yes Provider, Historical   SIMBRINZA 1-0.2 % drps SHAKE LQ AND INT 1 GTT IN OU TID 11/2/17  Yes Provider, Historical   ibuprofen (ADVIL) 200 mg tablet Take 200 mg by mouth every eight (8) hours as needed for Pain. Yes Provider, Historical   travoprost (TRAVATAN Z) 0.004 % ophthalmic solution Administer 1 Drop to both eyes every evening. Yes Provider, Historical   traMADol (ULTRAM) 50 mg tablet TAKE 1 TABLET BY MOUTH EVERY 8 HOURS AS NEEDED FOR PAIN 10/4/17   Lynette Marie NP          Allergies   Allergen Reactions    Asmanex Twisthaler [Mometasone] Other (comments) and Nausea and Vomiting     Dizziness and mouth sores  Dizziness and mouth sores    Fosamax [Alendronate] Other (comments) and Nausea and Vomiting     Leg cramps  Leg cramps    Prednisone Anxiety           ROS  See HPI    Objective:   Physical Exam   Constitutional: She is oriented to person, place, and time. She appears well-developed and well-nourished. No distress. HENT:   Right Ear: Tympanic membrane and ear canal normal.   Left Ear: A foreign body (cerumen) is present. A middle ear effusion is present. Nose: Mucosal edema present. Right sinus exhibits no maxillary sinus tenderness and no frontal sinus tenderness. Left sinus exhibits no maxillary sinus tenderness and no frontal sinus tenderness. Mouth/Throat: Oropharynx is clear and moist.   Cardiovascular: Normal rate, regular rhythm and normal heart sounds. No murmur heard. Pulmonary/Chest: Effort normal and breath sounds normal. She has no decreased breath sounds. She has no wheezes. She has no rhonchi. Lymphadenopathy:     She has no cervical adenopathy. Neurological: She is alert and oriented to person, place, and time. Psychiatric: She has a normal mood and affect. Her behavior is normal.   Nursing note and vitals reviewed. Visit Vitals  /59 (BP 1 Location: Left arm, BP Patient Position: Sitting)   Pulse 70   Temp 97.9 °F (36.6 °C) (Oral)   Resp 18   Ht 5' 5\" (1.651 m)   Wt 157 lb 6.4 oz (71.4 kg)   SpO2 97%   BMI 26.19 kg/m²       Assessment & Plan:  Diagnoses and all orders for this visit:    1. Bronchitis  Given prolonged course, cover with Zithromax.   -     azithromycin (ZITHROMAX) 250 mg tablet; Take 2 tablets today, then take 1 tablet daily    2. Impacted cerumen of left ear  -     REMOVE IMPACTED EAR WAX with lavage      I have discussed the diagnosis with the patient and the intended plan as seen in the above orders. The patient has received an after-visit summary along with patient information handout. I have discussed medication side effects and warnings with the patient as well. Follow-up Disposition:  Return if symptoms worsen or fail to improve.         Kiel Hutchinson NP

## 2019-03-05 NOTE — PROGRESS NOTES
No chief complaint on file. Reviewed Record in preparation for visit and have obtained necessary documentation. Identified pt with two pt identifiers (Name @ )    Health Maintenance Due   Topic    Shingrix Vaccine Age 50> (1 of 2)    Pneumococcal 65+ Low/Medium Risk (1 of 2 - PCV13)         1. Have you been to the ER, urgent care clinic since your last visit? Hospitalized since your last visit? no    2. Have you seen or consulted any other health care providers outside of the [address] [place] [address] since your last visit? Include any pap smears or colon screening.  no

## 2019-03-05 NOTE — PATIENT INSTRUCTIONS
Bronchitis: Care Instructions  Your Care Instructions    Bronchitis is inflammation of the bronchial tubes, which carry air to the lungs. The tubes swell and produce mucus, or phlegm. The mucus and inflamed bronchial tubes make you cough. You may have trouble breathing. Most cases of bronchitis are caused by viruses like those that cause colds. Antibiotics usually do not help and they may be harmful. Bronchitis usually develops rapidly and lasts about 2 to 3 weeks in otherwise healthy people. Follow-up care is a key part of your treatment and safety. Be sure to make and go to all appointments, and call your doctor if you are having problems. It's also a good idea to know your test results and keep a list of the medicines you take. How can you care for yourself at home? · Take all medicines exactly as prescribed. Call your doctor if you think you are having a problem with your medicine. · Get some extra rest.  · Take an over-the-counter pain medicine, such as acetaminophen (Tylenol), ibuprofen (Advil, Motrin), or naproxen (Aleve) to reduce fever and relieve body aches. Read and follow all instructions on the label. · Do not take two or more pain medicines at the same time unless the doctor told you to. Many pain medicines have acetaminophen, which is Tylenol. Too much acetaminophen (Tylenol) can be harmful. · Take an over-the-counter cough medicine that contains dextromethorphan to help quiet a dry, hacking cough so that you can sleep. Avoid cough medicines that have more than one active ingredient. Read and follow all instructions on the label. · Breathe moist air from a humidifier, hot shower, or sink filled with hot water. The heat and moisture will thin mucus so you can cough it out. · Do not smoke. Smoking can make bronchitis worse. If you need help quitting, talk to your doctor about stop-smoking programs and medicines. These can increase your chances of quitting for good.   When should you call for help? Call 911 anytime you think you may need emergency care. For example, call if:    · You have severe trouble breathing.    Call your doctor now or seek immediate medical care if:    · You have new or worse trouble breathing.     · You cough up dark brown or bloody mucus (sputum).     · You have a new or higher fever.     · You have a new rash.    Watch closely for changes in your health, and be sure to contact your doctor if:    · You cough more deeply or more often, especially if you notice more mucus or a change in the color of your mucus.     · You are not getting better as expected. Where can you learn more? Go to http://mehreen-grzegorz.info/. Enter H333 in the search box to learn more about \"Bronchitis: Care Instructions. \"  Current as of: September 5, 2018  Content Version: 11.9  © 4411-9706 [employer]. Care instructions adapted under license by [employer] (which disclaims liability or warranty for this information). If you have questions about a medical condition or this instruction, always ask your healthcare professional. [address] any warranty or liability for your use of this information.

## 2019-04-26 ENCOUNTER — TELEPHONE (OUTPATIENT)
Dept: FAMILY MEDICINE CLINIC | Age: 84
End: 2019-04-26

## 2019-04-26 NOTE — TELEPHONE ENCOUNTER
Nhi Forman, can you contact  Ryan Min re: her blood pressure and schedule an appointment for her? I am working tomorrow as well .

## 2019-05-13 ENCOUNTER — TELEPHONE (OUTPATIENT)
Dept: FAMILY MEDICINE CLINIC | Age: 84
End: 2019-05-13

## 2019-05-13 NOTE — TELEPHONE ENCOUNTER
[phone] spoke to patient needs to be seen appointment set up for 5/14/2019 at 3p patient understand advised if symptoms gets worse go to ER

## 2019-05-13 NOTE — TELEPHONE ENCOUNTER
----- Message from Jaycee Beltre sent at 5/13/2019 11:20 AM EDT -----  Regarding: NP Mina/ Telephone  Pt stated that her BP went up to 180/91 last night even after taking medication. Pt requested appt for 5/15 with MIGUELITO Danielle and no appt was available. Pt declined appt with any other doctor. Pt best contact number is [phone].

## 2019-05-14 ENCOUNTER — OFFICE VISIT (OUTPATIENT)
Dept: FAMILY MEDICINE CLINIC | Age: 84
End: 2019-05-14

## 2019-05-14 VITALS
OXYGEN SATURATION: 97 % | RESPIRATION RATE: 20 BRPM | WEIGHT: 158 LBS | TEMPERATURE: 98.5 F | SYSTOLIC BLOOD PRESSURE: 178 MMHG | HEIGHT: 65 IN | DIASTOLIC BLOOD PRESSURE: 77 MMHG | BODY MASS INDEX: 26.33 KG/M2 | HEART RATE: 70 BPM

## 2019-05-14 DIAGNOSIS — I10 ESSENTIAL HYPERTENSION: Primary | ICD-10-CM

## 2019-05-14 RX ORDER — LOSARTAN POTASSIUM 50 MG/1
75 TABLET ORAL DAILY
Qty: 45 TAB | Refills: 1 | COMMUNITY
Start: 2019-05-14 | End: 2019-05-21 | Stop reason: DRUGHIGH

## 2019-05-14 NOTE — PROGRESS NOTES
Chief Complaint   Patient presents with    Hypertension     pt is concerned that her blood pressure goes up at times. \"REVIEWED RECORD IN PREPARATION FOR VISIT AND HAVE OBTAINED THE NECESSARY DOCUMENTATION\"  1. Have you been to the ER, urgent care clinic since your last visit? Hospitalized since your last visit? No    2. Have you seen or consulted any other health care providers outside of the [facility] since your last visit? Include any pap smears or colon screening.  No

## 2019-05-14 NOTE — PROGRESS NOTES
[facility] Note    Fabiola Ashton is a 80 y.o. female who was seen in clinic today (5/14/2019). Subjective:  Cardiovascular Review:  The patient in for follow up of hypertension. Home readings: 140/80s - 170/80s  Diet and Lifestyle: generally follows a low fat low cholesterol diet, generally follows a low sodium diet, sedentary, nonsmoker  Pertinent ROS: taking medications as instructed, no medication side effects noted, no TIA's, no chest pain on exertion, no swelling of ankles. Currently taking Losartan 50 mg daily. Prior to Admission medications    Medication Sig Start Date End Date Taking? Authorizing Provider   losartan (COZAAR) 50 mg tablet Take 1.5 Tabs by mouth daily. 5/14/19  Yes Lolis Bhatia NP   cholecalciferol, vitamin D3, (VITAMIN D3) 2,000 unit tab Take  by mouth. Yes Provider, Historical   SIMBRINZA 1-0.2 % drps SHAKE LQ AND INT 1 GTT IN OU TID 11/2/17  Yes Provider, Historical   traMADol (ULTRAM) 50 mg tablet TAKE 1 TABLET BY MOUTH EVERY 8 HOURS AS NEEDED FOR PAIN 10/4/17  Yes Lolis Bhatia, NP   ibuprofen (ADVIL) 200 mg tablet Take 200 mg by mouth every eight (8) hours as needed for Pain. Yes Provider, Historical   travoprost (TRAVATAN Z) 0.004 % ophthalmic solution Administer 1 Drop to both eyes every evening. Yes Provider, Historical          Allergies   Allergen Reactions    Asmanex Twisthaler [Mometasone] Other (comments) and Nausea and Vomiting     Dizziness and mouth sores  Dizziness and mouth sores    Fosamax [Alendronate] Other (comments) and Nausea and Vomiting     Leg cramps  Leg cramps    Prednisone Anxiety           ROS  See HPI    Objective:   Physical Exam   Constitutional: She is oriented to person, place, and time. She appears well-developed and well-nourished. Neck: Normal range of motion. Neck supple. No JVD present. Carotid bruit is not present. No thyromegaly present.    Cardiovascular: Normal rate, regular rhythm and intact distal pulses. Exam reveals no gallop and no friction rub. No murmur heard. Pulmonary/Chest: Effort normal and breath sounds normal. No respiratory distress. Musculoskeletal: She exhibits no edema. Lymphadenopathy:     She has no cervical adenopathy. Neurological: She is alert and oriented to person, place, and time. Psychiatric: She has a normal mood and affect. Her behavior is normal.   Nursing note and vitals reviewed. Visit Vitals  /77 (BP 1 Location: Left arm, BP Patient Position: Sitting)   Pulse 70   Temp 98.5 °F (36.9 °C) (Oral)   Resp 20   Ht 5' 5\" (1.651 m)   Wt 158 lb (71.7 kg)   SpO2 97%   BMI 26.29 kg/m²       Assessment & Plan:  Diagnoses and all orders for this visit:    1. Essential hypertension  Increase Losartan to 75 mg daily. Continue home monitoring and call for reading >150/90. RTC in one week with readings. Also consider resuming Valsartan if not to goal.       I have discussed the diagnosis with the patient and the intended plan as seen in the above orders. The patient has received an after-visit summary along with patient information handout. I have discussed medication side effects and warnings with the patient as well. Follow-up and Dispositions    · Return in about 1 week (around 5/21/2019) for blood pressure.            Matilde Montes NP

## 2019-05-14 NOTE — PATIENT INSTRUCTIONS
Learning About ARBs  Introduction    ARBs (angiotensin II receptor blockers) block a hormone that makes blood vessels narrow. As a result, the blood vessels relax and widen. This lowers blood pressure. ARBs also put more water and salt into the urine. This also lowers blood pressure. ARBs can treat:  · High blood pressure. · Coronary artery disease. · Heart failure. They also may be used to help your kidneys when you have diabetes. Examples  ARBs include:  · Candesartan (Atacand). · Irbesartan (Avapro). · Losartan (Cozaar). This is not a complete list of all ARBs. Possible side effects  Side effects may include:  · Low blood pressure. You may feel dizzy and weak. · Diarrhea. · High potassium levels. You may have other side effects or reactions not listed here. Check the information that comes with your medicine. What to know about taking this medicine  · ARBs may be used if you had a cough when you tried to take an ACE inhibitor. ARBs are less likely to cause a cough. · You may need regular blood tests. · Take your medicines exactly as prescribed. Call your doctor if you think you are having a problem with your medicine. · Tell your doctor or pharmacist all the medicines you take. This includes over-the-counter medicines, vitamins, herbal products, and supplements. Taking some medicines together can cause problems. · You should not take ARBs if you are pregnant or planning to become pregnant. Where can you learn more? Go to http://mehreen-grzegorz.info/. Enter K212 in the search box to learn more about \"Learning About ARBs. \"  Current as of: July 22, 2018  Content Version: 11.9  © 8811-1961 [employer]. Care instructions adapted under license by [employer] (which disclaims liability or warranty for this information).  If you have questions about a medical condition or this instruction, always ask your healthcare professional. Tamar Zuniga disclaims any warranty or liability for your use of this information.

## 2019-05-15 ENCOUNTER — TELEPHONE (OUTPATIENT)
Dept: FAMILY MEDICINE CLINIC | Age: 84
End: 2019-05-15

## 2019-05-15 RX ORDER — HYDROCHLOROTHIAZIDE 25 MG/1
25 TABLET ORAL DAILY
Qty: 30 TAB | Refills: 0 | Status: SHIPPED | OUTPATIENT
Start: 2019-05-15 | End: 2019-06-12 | Stop reason: SDUPTHER

## 2019-05-15 NOTE — TELEPHONE ENCOUNTER
Spoke to patient per patient she woke up around 2AM with SOB, and coughing per patient it did went away around 4am and she is better now patient she did took Losartan 1.5 tablet this morning. she asking should she go back to her fluid pill her retired pharmacist friend thinks that she should go back to fluid pill per patient SOB and coughing is better.  I advised will send message to NP [facility]

## 2019-05-15 NOTE — TELEPHONE ENCOUNTER
Hydrochlorothiazide (fluid pill) sent to pharmacy. She can take it once daily with Losartan 50 mg. RTC for new or worsening symptoms.

## 2019-05-15 NOTE — TELEPHONE ENCOUNTER
FYI:    [facility] Call from Sara Chavez:    Patient is having shortness of breath, especially while laying down. Pt was transferred to Jannette's nurse.        Best callback:  [phone]      LOV:  Tuesday, May 14, 2019

## 2019-05-21 ENCOUNTER — OFFICE VISIT (OUTPATIENT)
Dept: FAMILY MEDICINE CLINIC | Age: 84
End: 2019-05-21

## 2019-05-21 ENCOUNTER — HOSPITAL ENCOUNTER (OUTPATIENT)
Dept: LAB | Age: 84
Discharge: HOME | End: 2019-05-21
Payer: MEDICARE

## 2019-05-21 VITALS
BODY MASS INDEX: 25.99 KG/M2 | RESPIRATION RATE: 16 BRPM | OXYGEN SATURATION: 96 % | HEART RATE: 69 BPM | TEMPERATURE: 98.2 F | WEIGHT: 156 LBS | HEIGHT: 65 IN | DIASTOLIC BLOOD PRESSURE: 53 MMHG | SYSTOLIC BLOOD PRESSURE: 97 MMHG

## 2019-05-21 DIAGNOSIS — I10 ESSENTIAL HYPERTENSION: Primary | ICD-10-CM

## 2019-05-21 DIAGNOSIS — R05.9 COUGH: ICD-10-CM

## 2019-05-21 DIAGNOSIS — R09.89 LABILE BLOOD PRESSURE: ICD-10-CM

## 2019-05-21 DIAGNOSIS — R06.01 ORTHOPNEA: ICD-10-CM

## 2019-05-21 PROCEDURE — 83880 ASSAY OF NATRIURETIC PEPTIDE: CPT

## 2019-05-21 PROCEDURE — 80053 COMPREHEN METABOLIC PANEL: CPT

## 2019-05-21 PROCEDURE — 85027 COMPLETE CBC AUTOMATED: CPT

## 2019-05-21 PROCEDURE — 84443 ASSAY THYROID STIM HORMONE: CPT

## 2019-05-21 RX ORDER — LOSARTAN POTASSIUM 50 MG/1
50 TABLET ORAL DAILY
COMMUNITY
Start: 2019-05-21 | End: 2020-02-13 | Stop reason: ALTCHOICE

## 2019-05-21 NOTE — PATIENT INSTRUCTIONS
Home Blood Pressure Test: About This Test  What is it? A home blood pressure test allows you to keep track of your blood pressure at home. Blood pressure is a measure of the force of blood against the walls of your arteries. Blood pressure readings include two numbers, such as 130/80 (say \"130 over 80\"). The first number is the systolic pressure. The second number is the diastolic pressure. Why is this test done? You may do this test at home to:  · Find out if you have high blood pressure. · Track your blood pressure if you have high blood pressure. · Track how well medicine is working to reduce high blood pressure. · Check how lifestyle changes, such as weight loss and exercise, are affecting blood pressure. How can you prepare for the test?  · Do not use caffeine, tobacco, or medicines known to raise blood pressure (such as nasal decongestant sprays) for at least 30 minutes before taking your blood pressure. · Do not exercise for at least 30 minutes before taking your blood pressure. What happens before the test?  Take your blood pressure while you feel comfortable and relaxed. Sit quietly with both feet on the floor for at least 5 minutes before the test.  What happens during the test?  · Sit with your arm slightly bent and resting on a table so that your upper arm is at the same level as your heart. · Roll up your sleeve or take off your shirt to expose your upper arm. · Wrap the blood pressure cuff around your upper arm so that the lower edge of the cuff is about 1 inch above the bend of your elbow. Proceed with the following steps depending on if you are using an automatic or manual pressure monitor. Automatic blood pressure monitors  · Press the on/off button on the automatic monitor and wait until the ready-to-measure \"heart\" symbol appears next to zero in the display window. · Press the start button. The cuff will inflate and deflate by itself.   · Your blood pressure numbers will appear on the screen. · Write your numbers in your log book, along with the date and time. Manual blood pressure monitors  · Place the earpieces of a stethoscope in your ears, and place the bell of the stethoscope over the artery, just below the cuff. · Close the valve on the rubber inflating bulb. · Squeeze the bulb rapidly with your opposite hand to inflate the cuff until the dial or column of mercury reads about 30 mm Hg higher than your usual systolic pressure. If you do not know your usual pressure, inflate the cuff to 210 mm Hg or until the pulse at your wrist disappears. · Open the pressure valve just slightly by twisting or pressing the valve on the bulb. · As you watch the pressure slowly fall, note the level on the dial at which you first start to hear a pulsing or tapping sound through the stethoscope. This is your systolic blood pressure. · Continue letting the air out slowly. The sounds will become muffled and will finally disappear. Note the pressure when the sounds completely disappear. This is your diastolic blood pressure. Let out all the remaining air. · Write your numbers in your log book, along with the date and time. What else should you know about the test?  It is more accurate to take the average of several readings made throughout the day than to rely on a single reading. It's normal for blood pressure to go up and down throughout the day. Follow-up care is a key part of your treatment and safety. Be sure to make and go to all appointments, and call your doctor if you are having problems. It's also a good idea to keep a list of the medicines you take. Where can you learn more? Go to http://mehreen-grzegorz.info/. Enter C427 in the search box to learn more about \"Home Blood Pressure Test: About This Test.\"  Current as of: July 22, 2018  Content Version: 11.9  © 9782-3370 [employer].  Care instructions adapted under license by [employer] (which disclaims liability or warranty for this information). If you have questions about a medical condition or this instruction, always ask your healthcare professional. [address] any warranty or liability for your use of this information.

## 2019-05-21 NOTE — PROGRESS NOTES
Chief Complaint   Patient presents with    Hypertension     1 week f/u        Reviewed Record in preparation for visit and have obtained necessary documentation. Identified pt with two pt identifiers (Name @ )    Health Maintenance Due   Topic    Pneumococcal 65+ years (1 of 2 - PCV13)         1. Have you been to the ER, urgent care clinic since your last visit? Hospitalized since your last visit? no    2. Have you seen or consulted any other health care providers outside of the [address] [place] [address] since your last visit? Include any pap smears or colon screening.  no

## 2019-05-21 NOTE — PROGRESS NOTES
[facility] Note    Edin Solis is a 80 y.o. female who was seen in clinic today (5/21/2019). Subjective:  Hypertension  Patient is here for follow-up of hypertension. Her Losartan was increased to 75 mg last week due to increased blood pressure readings of 170/90. Patient reported new onset of orthopnea and patient was started on HCTZ. Since starting the HCTZ patient has had dizziness and fatigue. Home blood pressures in 90/70s. Patient has had previous cardiac evaluation in 2011 for LBBB. Patient also reports new onset of cough in last week. Associated cough with discontinuing her Zyrtec. Prior to Admission medications    Medication Sig Start Date End Date Taking? Authorizing Provider   losartan (COZAAR) 50 mg tablet Take 1 Tab by mouth daily. 5/21/19  Yes Talbert, Worth Schilder, NP   hydroCHLOROthiazide (HYDRODIURIL) 25 mg tablet Take 1 Tab by mouth daily. 5/15/19  Yes Talbert, Worth Schilder, NP   SIMBRINZA 1-0.2 % drps SHAKE LQ AND INT 1 GTT IN OU TID 11/2/17  Yes Provider, Historical   ibuprofen (ADVIL) 200 mg tablet Take 200 mg by mouth every eight (8) hours as needed for Pain. Yes Provider, Historical   travoprost (TRAVATAN Z) 0.004 % ophthalmic solution Administer 1 Drop to both eyes every evening. Yes Provider, Historical   cholecalciferol, vitamin D3, (VITAMIN D3) 2,000 unit tab Take  by mouth. Provider, Historical   traMADol (ULTRAM) 50 mg tablet TAKE 1 TABLET BY MOUTH EVERY 8 HOURS AS NEEDED FOR PAIN 10/4/17   Kenyatta Garcia NP          Allergies   Allergen Reactions    Asmanex Twisthaler [Mometasone] Other (comments) and Nausea and Vomiting     Dizziness and mouth sores  Dizziness and mouth sores    Fosamax [Alendronate] Other (comments) and Nausea and Vomiting     Leg cramps  Leg cramps    Prednisone Anxiety           ROS  See HPI    Objective:   Physical Exam   Constitutional: She is oriented to person, place, and time.  She appears well-developed and well-nourished. Neck: Normal range of motion. Neck supple. No JVD present. Carotid bruit is not present. No thyromegaly present. Cardiovascular: Normal rate, regular rhythm and intact distal pulses. Exam reveals distant heart sounds. Exam reveals no gallop and no friction rub. No murmur heard. Pulmonary/Chest: Effort normal and breath sounds normal. No respiratory distress. Musculoskeletal: She exhibits no edema. Lymphadenopathy:     She has no cervical adenopathy. Neurological: She is alert and oriented to person, place, and time. Psychiatric: She has a normal mood and affect. Her behavior is normal.   Nursing note and vitals reviewed. Visit Vitals  BP 97/53 (BP 1 Location: Left arm, BP Patient Position: Sitting)   Pulse 69   Temp 98.2 °F (36.8 °C) (Oral)   Resp 16   Ht 5' 5\" (1.651 m)   Wt 156 lb (70.8 kg)   SpO2 96%   BMI 25.96 kg/m²       Assessment & Plan:  Diagnoses and all orders for this visit:    1. Essential hypertension  Hypotensive at present. Reduce Losartan to 50 mg daily and  HCTZ to 12.5 g daily. Continue home monitoring and call for reading >140/90 and <110/60. Cardiac evaluation needed. -     REFERRAL TO CARDIOLOGY  -     XR CHEST PA LAT; Future  -     METABOLIC PANEL, COMPREHENSIVE  -     CBC W/O DIFF  -     TSH AND FREE T4    2. Labile blood pressure  -     REFERRAL TO CARDIOLOGY    3. Orthopnea  Check BNP. CXR today. Request cardiac evaluation.   -     REFERRAL TO CARDIOLOGY  -     XR CHEST PA LAT; Future  -     NT-PRO BNP    4. Cough  Likely allergic but r/o pleural effusion. Evaluate with CXR. I have discussed the diagnosis with the patient and the intended plan as seen in the above orders. The patient has received an after-visit summary along with patient information handout. I have discussed medication side effects and warnings with the patient as well.       Follow-up and Dispositions    · Return in about 3 months (around 8/21/2019) for blood pressure, disease management.            Leo Stewart NP

## 2019-05-22 LAB
ALBUMIN SERPL-MCNC: 4.4 G/DL (ref 3.5–4.7)
ALBUMIN/GLOB SERPL: 2.3 {RATIO} (ref 1.2–2.2)
ALP SERPL-CCNC: 104 IU/L (ref 39–117)
ALT SERPL-CCNC: 19 IU/L (ref 0–32)
AST SERPL-CCNC: 19 IU/L (ref 0–40)
BILIRUB SERPL-MCNC: 0.3 MG/DL (ref 0–1.2)
BUN SERPL-MCNC: 30 MG/DL (ref 8–27)
BUN/CREAT SERPL: 26 (ref 12–28)
CALCIUM SERPL-MCNC: 10.1 MG/DL (ref 8.7–10.3)
CHLORIDE SERPL-SCNC: 100 MMOL/L (ref 96–106)
CO2 SERPL-SCNC: 25 MMOL/L (ref 20–29)
CREAT SERPL-MCNC: 1.14 MG/DL (ref 0.57–1)
ERYTHROCYTE [DISTWIDTH] IN BLOOD BY AUTOMATED COUNT: 15 % (ref 12.3–15.4)
GLOBULIN SER CALC-MCNC: 1.9 G/DL (ref 1.5–4.5)
GLUCOSE SERPL-MCNC: 85 MG/DL (ref 65–99)
HCT VFR BLD AUTO: 38.4 % (ref 34–46.6)
HGB BLD-MCNC: 12.3 G/DL (ref 11.1–15.9)
INTERPRETATION: NORMAL
MCH RBC QN AUTO: 25.3 PG (ref 26.6–33)
MCHC RBC AUTO-ENTMCNC: 32 G/DL (ref 31.5–35.7)
MCV RBC AUTO: 79 FL (ref 79–97)
NT-PROBNP SERPL-MCNC: 255 PG/ML (ref 0–738)
PLATELET # BLD AUTO: 271 X10E3/UL (ref 150–450)
POTASSIUM SERPL-SCNC: 3.9 MMOL/L (ref 3.5–5.2)
PROT SERPL-MCNC: 6.3 G/DL (ref 6–8.5)
RBC # BLD AUTO: 4.86 X10E6/UL (ref 3.77–5.28)
SODIUM SERPL-SCNC: 139 MMOL/L (ref 134–144)
T4 FREE SERPL-MCNC: 1.21 NG/DL (ref 0.82–1.77)
TSH SERPL DL<=0.005 MIU/L-ACNC: 1.94 UIU/ML (ref 0.45–4.5)
WBC # BLD AUTO: 5.4 X10E3/UL (ref 3.4–10.8)

## 2019-05-23 NOTE — PROGRESS NOTES
Her thyroid levels, glucose, liver were normal. Her blood cells for infection and anemia were normal.    Her kidney function remains stable but indicates you have moderate kidney disease. Please stay hydrated and avoid medications like Advil and Aleve which are hard on the kidneys. Tylenol is safe to take.

## 2019-06-12 RX ORDER — HYDROCHLOROTHIAZIDE 25 MG/1
TABLET ORAL
Qty: 30 TAB | Refills: 0 | Status: SHIPPED | OUTPATIENT
Start: 2019-06-12 | End: 2020-02-13 | Stop reason: ALTCHOICE

## 2019-08-22 ENCOUNTER — OFFICE VISIT (OUTPATIENT)
Dept: FAMILY MEDICINE CLINIC | Age: 84
End: 2019-08-22

## 2019-08-22 VITALS
TEMPERATURE: 98.1 F | WEIGHT: 159 LBS | DIASTOLIC BLOOD PRESSURE: 69 MMHG | HEART RATE: 60 BPM | BODY MASS INDEX: 26.49 KG/M2 | HEIGHT: 65 IN | OXYGEN SATURATION: 97 % | SYSTOLIC BLOOD PRESSURE: 136 MMHG | RESPIRATION RATE: 18 BRPM

## 2019-08-22 DIAGNOSIS — G56.03 BILATERAL CARPAL TUNNEL SYNDROME: ICD-10-CM

## 2019-08-22 DIAGNOSIS — N18.30 STAGE 3 CHRONIC KIDNEY DISEASE (HCC): ICD-10-CM

## 2019-08-22 DIAGNOSIS — I10 ESSENTIAL HYPERTENSION: Primary | ICD-10-CM

## 2019-08-22 RX ORDER — TRAVOPROST OPHTHALMIC SOLUTION 0.04 MG/ML
SOLUTION OPHTHALMIC
COMMUNITY
Start: 2018-02-02

## 2019-08-22 RX ORDER — LOSARTAN POTASSIUM 25 MG/1
25 TABLET ORAL DAILY
Refills: 3 | COMMUNITY
Start: 2019-07-01 | End: 2020-11-20 | Stop reason: SDUPTHER

## 2019-08-22 NOTE — PROGRESS NOTES
Chief Complaint   Patient presents with    Medication Problem     states htn meds not working (per Dr Ephraim Rowland cardiologist , changes made )       Reviewed Record in preparation for visit and have obtained necessary documentation. Identified pt with two pt identifiers (Name @ )    Health Maintenance Due   Topic    Pneumococcal 65+ years (1 of 2 - PCV13)    Influenza Age 5 to Adult     GLAUCOMA SCREENING Q2Y     MEDICARE YEARLY EXAM          1. Have you been to the ER, urgent care clinic since your last visit? Hospitalized since your last visit? no    2. Have you seen or consulted any other health care providers outside of the [address] [place] [address] since your last visit? Include any pap smears or colon screening.  no

## 2019-08-22 NOTE — PROGRESS NOTES
[facility] Note    Aisha England is a 80 y.o. female who was seen in clinic today (8/22/2019). Subjective:  Hypertension  Patient is here for follow-up of hypertension. She recently saw cardiologist, Dr. Ambreen Godoy. Reports having a nuclear stress test. She was placed on Carvedilol 3.125 mg BID. Home readings ranging between 130//80s. Patient taking Losartan 12.5 PRN. HCTZ patient has had dizziness and fatigue. Osteoarthritis and Chronic Pain:  Patient has osteoarthritis, primarily affecting the neck and back. Symptoms onset: problem is longstanding. Rheumatological ROS:  Using Aspirin daily and  Tramadol intermittently. Response to treatment plan: symptoms have progressed to a point and plateaued. Reports continued burning and tingling in bilateral arms. Reports diminished  strength at times. Seen by neurology, Dr. Guerda Partida and advised that she had carpal tunnel syndrome. EMG (5/2018) showed bilateral severe CTS. Prior to Admission medications    Medication Sig Start Date End Date Taking? Authorizing Provider   SIMBRINZA 1-0.2 % drps SHAKE LQ AND INT 1 GTT IN OU TID 11/2/17  Yes Provider, Historical   traMADol (ULTRAM) 50 mg tablet TAKE 1 TABLET BY MOUTH EVERY 8 HOURS AS NEEDED FOR PAIN 10/4/17  Yes Ross Bhatia, NP   ibuprofen (ADVIL) 200 mg tablet Take 200 mg by mouth every eight (8) hours as needed for Pain. Yes Provider, Historical   travoprost (TRAVATAN Z) 0.004 % ophthalmic solution Administer 1 Drop to both eyes every evening. Yes Provider, Historical   losartan (COZAAR) 25 mg tablet Take 25 mg by mouth daily.  Take 1 tab po daily 7/1/19   Provider, Historical   travoprost (TRAVATAN Z) 0.004 % ophthalmic solution INSTILL 1 GTT ONTO BOTH EYES QHS 2/2/18   Provider, Historical   brinzolamide-brimonidine 1-0.2 % drps SHAKE LQ AND INT 1 GTT IN OU TID 11/2/17   Provider, Historical   hydroCHLOROthiazide (HYDRODIURIL) 25 mg tablet TAKE 1 TABLET BY MOUTH DAILY 6/12/19   Rama Vasquez NP   losartan (COZAAR) 50 mg tablet Take 1 Tab by mouth daily. 5/21/19   Rama Vasquez NP   cholecalciferol, vitamin D3, (VITAMIN D3) 2,000 unit tab Take  by mouth. Provider, Historical          Allergies   Allergen Reactions    Asmanex Twisthaler [Mometasone] Other (comments) and Nausea and Vomiting     Dizziness and mouth sores  Dizziness and mouth sores    Fosamax [Alendronate] Other (comments) and Nausea and Vomiting     Leg cramps  Leg cramps    Prednisone Anxiety           ROS  See HPI    Objective:   Physical Exam   Constitutional: She is oriented to person, place, and time. She appears well-developed and well-nourished. Neck: Normal range of motion. Neck supple. No JVD present. Carotid bruit is not present. No thyromegaly present. Cardiovascular: Normal rate, regular rhythm and intact distal pulses. Exam reveals no gallop and no friction rub. No murmur heard. Pulmonary/Chest: Effort normal and breath sounds normal. No respiratory distress. Musculoskeletal: She exhibits no edema. Lymphadenopathy:     She has no cervical adenopathy. Neurological: She is alert and oriented to person, place, and time. Psychiatric: She has a normal mood and affect. Her behavior is normal.   Nursing note and vitals reviewed. Visit Vitals  /69 (BP 1 Location: Left arm, BP Patient Position: Sitting)   Pulse 60   Temp 98.1 °F (36.7 °C) (Oral)   Resp 18   Ht 5' 5\" (1.651 m)   Wt 159 lb (72.1 kg)   SpO2 97%   BMI 26.46 kg/m²       Assessment & Plan:  Diagnoses and all orders for this visit:    1. Essential hypertension  Continue carvedilol BID. Continue home monitoring and call for reading >150/90    2. Bilateral carpal tunnel syndrome  Follow up with orthopedics as previously recommended.      3. Stage 3 chronic kidney disease (HCC)  Stable, no changes to current therapy      I have discussed the diagnosis with the patient and the intended plan as seen in the above orders. The patient has received an after-visit summary along with patient information handout. I have discussed medication side effects and warnings with the patient as well. Follow-up and Dispositions    · Return in about 6 months (around 2/22/2020) for blood pressure.            Lorelei Barreto, NP       Counseling More Than 50% of the Appointment Time: Face-to face time >25 minutes

## 2019-08-22 NOTE — PATIENT INSTRUCTIONS
Home Blood Pressure Test: About This Test  What is it? A home blood pressure test allows you to keep track of your blood pressure at home. Blood pressure is a measure of the force of blood against the walls of your arteries. Blood pressure readings include two numbers, such as 130/80 (say \"130 over 80\"). The first number is the systolic pressure. The second number is the diastolic pressure. Why is this test done? You may do this test at home to:  · Find out if you have high blood pressure. · Track your blood pressure if you have high blood pressure. · Track how well medicine is working to reduce high blood pressure. · Check how lifestyle changes, such as weight loss and exercise, are affecting blood pressure. How can you prepare for the test?  · Do not use caffeine, tobacco, or medicines known to raise blood pressure (such as nasal decongestant sprays) for at least 30 minutes before taking your blood pressure. · Do not exercise for at least 30 minutes before taking your blood pressure. What happens before the test?  Take your blood pressure while you feel comfortable and relaxed. Sit quietly with both feet on the floor for at least 5 minutes before the test.  What happens during the test?  · Sit with your arm slightly bent and resting on a table so that your upper arm is at the same level as your heart. · Roll up your sleeve or take off your shirt to expose your upper arm. · Wrap the blood pressure cuff around your upper arm so that the lower edge of the cuff is about 1 inch above the bend of your elbow. Proceed with the following steps depending on if you are using an automatic or manual pressure monitor. Automatic blood pressure monitors  · Press the on/off button on the automatic monitor and wait until the ready-to-measure \"heart\" symbol appears next to zero in the display window. · Press the start button. The cuff will inflate and deflate by itself.   · Your blood pressure numbers will appear on the screen. · Write your numbers in your log book, along with the date and time. Manual blood pressure monitors  · Place the earpieces of a stethoscope in your ears, and place the bell of the stethoscope over the artery, just below the cuff. · Close the valve on the rubber inflating bulb. · Squeeze the bulb rapidly with your opposite hand to inflate the cuff until the dial or column of mercury reads about 30 mm Hg higher than your usual systolic pressure. If you do not know your usual pressure, inflate the cuff to 210 mm Hg or until the pulse at your wrist disappears. · Open the pressure valve just slightly by twisting or pressing the valve on the bulb. · As you watch the pressure slowly fall, note the level on the dial at which you first start to hear a pulsing or tapping sound through the stethoscope. This is your systolic blood pressure. · Continue letting the air out slowly. The sounds will become muffled and will finally disappear. Note the pressure when the sounds completely disappear. This is your diastolic blood pressure. Let out all the remaining air. · Write your numbers in your log book, along with the date and time. What else should you know about the test?  It is more accurate to take the average of several readings made throughout the day than to rely on a single reading. It's normal for blood pressure to go up and down throughout the day. Follow-up care is a key part of your treatment and safety. Be sure to make and go to all appointments, and call your doctor if you are having problems. It's also a good idea to keep a list of the medicines you take. Where can you learn more? Go to http://mehreen-grzegorz.info/. Enter C427 in the search box to learn more about \"Home Blood Pressure Test: About This Test.\"  Current as of: July 22, 2018  Content Version: 12.1  © 4707-8648 [employer], [employer]orporated.  Care instructions adapted under license by [employer] (which disclaims liability or warranty for this information). If you have questions about a medical condition or this instruction, always ask your healthcare professional. [address] any warranty or liability for your use of this information.

## 2019-09-23 ENCOUNTER — TELEPHONE (OUTPATIENT)
Dept: FAMILY MEDICINE CLINIC | Age: 84
End: 2019-09-23

## 2019-09-23 NOTE — TELEPHONE ENCOUNTER
----- Message from Surendra Ellis sent at 9/21/2019  7:47 AM EDT -----  Regarding: MIGUELITO Bhatia/Telephone  General Message/Vendor Calls    Caller's first and last name:      Reason for call: Pt is requesting a call from MIGUELITO Don regarding her most recent blood lab results to be sent to Dr Pamela Serrano.       Callback required yes/no and why:      Best contact number(s):[phone]      Details to clarify the request:      Surendra Ellis

## 2019-11-08 ENCOUNTER — TELEPHONE (OUTPATIENT)
Dept: FAMILY MEDICINE CLINIC | Age: 84
End: 2019-11-08

## 2019-11-08 NOTE — TELEPHONE ENCOUNTER
[phone] spoke to patient notified of her appointment with Dr Maria Isabel Payne 11/12/2019 patient didn't want the appointment she wanted to wait. Called Dr Maria Isabel Payne office spoke to [place] and cancelled patient appointment     Patient is calling stating that she saw Neurologist (Dr. Quincy Earl) 11/4/19. Pt stated that the neurologist recommended the patient to take Vitamin B6 50mg (take three times a day). Pt states that it sounds like a lot of medication, due to the pt taking heart medication.

## 2019-11-08 NOTE — TELEPHONE ENCOUNTER
Patient is calling stating that she saw Neurologist (Dr. Timbo Truong) 11/4/19. Pt stated that the neurologist recommended the patient to take Vitamin B6 50mg (take three times a day). Pt states that it sounds like a lot of medication, due to the pt taking heart medication. Pt wants to know does she need to make appointment for hand specialist or will Porsche Mohan do it for her.        Best callback:  [phone]      LOV:  Thursday, August 22, 2019

## 2020-02-05 ENCOUNTER — OFFICE VISIT (OUTPATIENT)
Dept: FAMILY MEDICINE CLINIC | Age: 85
End: 2020-02-05

## 2020-02-05 DIAGNOSIS — I10 ESSENTIAL HYPERTENSION: Primary | ICD-10-CM

## 2020-02-05 DIAGNOSIS — N18.30 STAGE 3 CHRONIC KIDNEY DISEASE (HCC): ICD-10-CM

## 2020-02-05 DIAGNOSIS — Z71.89 ACP (ADVANCE CARE PLANNING): ICD-10-CM

## 2020-02-05 DIAGNOSIS — G56.03 BILATERAL CARPAL TUNNEL SYNDROME: ICD-10-CM

## 2020-02-05 DIAGNOSIS — Z00.00 MEDICARE ANNUAL WELLNESS VISIT, SUBSEQUENT: ICD-10-CM

## 2020-02-05 RX ORDER — CARVEDILOL 3.12 MG/1
TABLET ORAL
COMMUNITY
Start: 2020-01-03

## 2020-02-05 RX ORDER — TRIAMTERENE/HYDROCHLOROTHIAZID 37.5-25 MG
TABLET ORAL
COMMUNITY
Start: 2019-12-03 | End: 2020-10-07

## 2020-02-05 RX ORDER — CARVEDILOL 3.12 MG/1
TABLET ORAL
COMMUNITY
Start: 2019-10-03 | End: 2020-02-13 | Stop reason: SDUPTHER

## 2020-02-05 NOTE — PROGRESS NOTES
[facility] Note    Hodan Pérez is a 80 y.o. female who was seen in clinic today (2/10/2020). Subjective:  Cardiovascular Review:  The patient has hypertension. Seen by cardiology, Dr. Yokasta Jacobo. Diet and Lifestyle: generally follows a low fat low cholesterol diet, generally follows a low sodium diet, sedentary, nonsmoker  Home BP Monitoring: is well controlled at home, ranging 110/60's-150/90s  Pertinent ROS: taking medications as instructed, no medication side effects noted, no TIA's, no chest pain on exertion, no dyspnea on exertion, no swelling of ankles. Hypertension  Patient is here for follow-up of hypertension. She recently saw cardiologist, Dr. Yokasta Jacobo. Reports having a nuclear stress test. She was placed on Carvedilol 3.125 mg BID. Home readings ranging between 130//80s. Patient taking Losartan 12.5 mg daily and Triamterene-HCT 1/2 tablet daily. Osteoarthritis and Chronic Pain:  Patient has osteoarthritis, primarily affecting the neck and back. Symptoms onset: problem is longstanding. Rheumatological ROS:  Using Aspirin daily and  Tramadol intermittently. Response to treatment plan: symptoms have progressed to a point and plateaued. Reports continued burning and tingling in bilateral arms. Reports diminished  strength at times. Seen by neurology, Dr. Davian Haider and advised that she had carpal tunnel syndrome. EMG (5/2018) showed bilateral severe CTS. Prior to Admission medications    Medication Sig Start Date End Date Taking? Authorizing Provider   carvediloL (COREG) 3.125 mg tablet TK 1 T PO BID 10/3/19  Yes Provider, Historical   triamterene-hydroCHLOROthiazide (MAXZIDE) 37.5-25 mg per tablet  12/3/19  Yes Provider, Historical   losartan (COZAAR) 25 mg tablet Take 25 mg by mouth daily.  Take 1 tab po daily 7/1/19  Yes Provider, Historical   brinzolamide-brimonidine 1-0.2 % drps SHAKE LQ AND INT 1 GTT IN OU TID 11/2/17  Yes Provider, Historical   SIMBRINZA 1-0.2 % drps SHAKE LQ AND INT 1 GTT IN OU TID 11/2/17  Yes Provider, Historical   ibuprofen (ADVIL) 200 mg tablet Take 200 mg by mouth every eight (8) hours as needed for Pain. Yes Provider, Historical   travoprost (TRAVATAN Z) 0.004 % ophthalmic solution Administer 1 Drop to both eyes every evening. Yes Provider, Historical   carvediloL (COREG) 3.125 mg tablet TK 1 T PO BID 1/3/20   Provider, Historical   travoprost (TRAVATAN Z) 0.004 % ophthalmic solution INSTILL 1 GTT ONTO BOTH EYES QHS 2/2/18   Provider, Historical   hydroCHLOROthiazide (HYDRODIURIL) 25 mg tablet TAKE 1 TABLET BY MOUTH DAILY 6/12/19   Elissa Fregoso NP   losartan (COZAAR) 50 mg tablet Take 1 Tab by mouth daily. 5/21/19   Elissa Fregoso NP   cholecalciferol, vitamin D3, (VITAMIN D3) 2,000 unit tab Take  by mouth. Provider, Historical   traMADol (ULTRAM) 50 mg tablet TAKE 1 TABLET BY MOUTH EVERY 8 HOURS AS NEEDED FOR PAIN 10/4/17   Elissa Fregoso NP          Allergies   Allergen Reactions    Asmanex Twisthaler [Mometasone] Other (comments) and Nausea and Vomiting     Dizziness and mouth sores  Dizziness and mouth sores    Fosamax [Alendronate] Other (comments) and Nausea and Vomiting     Leg cramps  Leg cramps    Prednisone Anxiety           ROS  See HPI    Objective:   Physical Exam  Vitals signs and nursing note reviewed. Constitutional:       Appearance: She is well-developed. Neck:      Musculoskeletal: Normal range of motion and neck supple. Thyroid: No thyromegaly. Vascular: No carotid bruit or JVD. Cardiovascular:      Rate and Rhythm: Normal rate and regular rhythm. Heart sounds: No murmur. No friction rub. No gallop. Pulmonary:      Effort: Pulmonary effort is normal. No respiratory distress. Breath sounds: Normal breath sounds. Lymphadenopathy:      Cervical: No cervical adenopathy.    Neurological:      Mental Status: She is alert and oriented to person, place, and time. Psychiatric:         Behavior: Behavior normal.           Visit Vitals  /60 (BP 1 Location: Left arm, BP Patient Position: Sitting)   Pulse 61   Temp 96.4 °F (35.8 °C) (Oral)   Resp 18   Ht 5' 5\" (1.651 m)   Wt 164 lb (74.4 kg)   SpO2 94%   BMI 27.29 kg/m²       Assessment & Plan:  Diagnoses and all orders for this visit:    1. Essential hypertension  Stable, managed by cardiology. No changes to current therapy. 2. Stage 3 chronic kidney disease (HCC)  Stable, no changes to current therapy. Avoid NSAIDs. 3. Bilateral carpal tunnel syndrome  Request orthopedic evaluation and treatment.   -     REFERRAL TO ORTHOPEDIC SURGERY        I have discussed the diagnosis with the patient and the intended plan as seen in the above orders. The patient has received an after-visit summary along with patient information handout. I have discussed medication side effects and warnings with the patient as well. Follow-up and Dispositions    · Return in about 6 months (around 8/5/2020) for blood pressure, disease management.            Susanne Ricci NP

## 2020-02-05 NOTE — PROGRESS NOTES
Katlyn Johnson is a 80 y.o. female    Chief Complaint   Patient presents with    Complete Physical       1. Have you been to the ER, urgent care clinic since your last visit? Hospitalized since your last visit? No      2. Have you seen or consulted any other health care providers outside of the [address] [place] [address] since your last visit? Include any pap smears or colon screening.   No

## 2020-02-10 VITALS
TEMPERATURE: 96.4 F | OXYGEN SATURATION: 94 % | HEART RATE: 61 BPM | SYSTOLIC BLOOD PRESSURE: 110 MMHG | WEIGHT: 164 LBS | BODY MASS INDEX: 27.32 KG/M2 | DIASTOLIC BLOOD PRESSURE: 60 MMHG | HEIGHT: 65 IN | RESPIRATION RATE: 18 BRPM

## 2020-02-11 NOTE — PROGRESS NOTES
This is the Subsequent Medicare Annual Wellness Exam, performed 12 months or more after the Initial AWV or the last Subsequent AWV    I have reviewed the patient's medical history in detail and updated the computerized patient record. History     Patient Active Problem List   Diagnosis Code    DJD (degenerative joint disease) M19.90    HTN (hypertension) I10    AR (allergic rhinitis) J30.9    Glaucoma H40.9    TMJ (temporomandibular joint disorder) M26.609    Osteopenia M85.80    Mixed hyperlipidemia E78.2    ACP (advance care planning) Z71.89    Stage 3 chronic kidney disease ([facility] [address] N18.3    Bilateral carpal tunnel syndrome G56.03     Past Medical History:   Diagnosis Date    AR (allergic rhinitis) 5/4/2010    Asthmatic bronchitis 5/4/2010    DJD (degenerative joint disease) 5/4/2010    Glaucoma 5/4/2010    HTN (hypertension) 5/4/2010    Mixed hyperlipidemia     normal particle number 4/2012    Osteopenia 5/4/2010    TMJ (temporomandibular joint disorder) 5/4/2010    Viral labyrinthitis 5/4/2010      Past Surgical History:   Procedure Laterality Date    ENDOSCOPY, COLON, DIAGNOSTIC      HX APPENDECTOMY      HX HERNIA REPAIR      HX MOHS PROCEDURES      HX TUBAL LIGATION      HX WRIST FRACTURE TX  08/16/13     Current Outpatient Medications   Medication Sig Dispense Refill    carvediloL (COREG) 3.125 mg tablet TK 1 T PO BID      triamterene-hydroCHLOROthiazide (MAXZIDE) 37.5-25 mg per tablet       losartan (COZAAR) 25 mg tablet Take 25 mg by mouth daily. Take 1 tab po daily  3    brinzolamide-brimonidine 1-0.2 % drps SHAKE LQ AND INT 1 GTT IN OU TID      SIMBRINZA 1-0.2 % drps SHAKE LQ AND INT 1 GTT IN OU TID  3    ibuprofen (ADVIL) 200 mg tablet Take 200 mg by mouth every eight (8) hours as needed for Pain.  travoprost (TRAVATAN Z) 0.004 % ophthalmic solution Administer 1 Drop to both eyes every evening.       carvediloL (COREG) 3.125 mg tablet TK 1 T PO BID      travoprost (TRAVATAN Z) 0.004 % ophthalmic solution INSTILL 1 GTT ONTO BOTH EYES QHS      hydroCHLOROthiazide (HYDRODIURIL) 25 mg tablet TAKE 1 TABLET BY MOUTH DAILY 30 Tab 0    losartan (COZAAR) 50 mg tablet Take 1 Tab by mouth daily.  cholecalciferol, vitamin D3, (VITAMIN D3) 2,000 unit tab Take  by mouth.  traMADol (ULTRAM) 50 mg tablet TAKE 1 TABLET BY MOUTH EVERY 8 HOURS AS NEEDED FOR PAIN 60 Tab 1     Allergies   Allergen Reactions    Asmanex Twisthaler [Mometasone] Other (comments) and Nausea and Vomiting     Dizziness and mouth sores  Dizziness and mouth sores    Fosamax [Alendronate] Other (comments) and Nausea and Vomiting     Leg cramps  Leg cramps    Prednisone Anxiety       Family History   Problem Relation Age of Onset    No Known Problems Mother     No Known Problems Father      Social History     Tobacco Use    Smoking status: Former Smoker     Packs/day: 0.20     Years: 3.00     Pack years: 0.60    Smokeless tobacco: Never Used   Substance Use Topics    Alcohol use: No       Depression Risk Factor Screening:     3 most recent PHQ Screens 2/5/2020   Little interest or pleasure in doing things Not at all   Feeling down, depressed, irritable, or hopeless Not at all   Total Score PHQ 2 0       Alcohol Risk Factor Screening:   Do you average 1 drink per night or more than 7 drinks a week:  No    On any one occasion in the past three months have you have had more than 3 drinks containing alcohol:  No      Functional Ability and Level of Safety:   Hearing: Hearing is good. Activities of Daily Living: The home contains: no safety equipment. Patient does total self care    Ambulation: with no difficulty    Fall Risk:  Fall Risk Assessment, last 12 mths 2/5/2020   Able to walk? Yes   Fall in past 12 months?  No   Fall with injury? -   Number of falls in past 12 months -   Fall Risk Score -       Abuse Screen:  Patient is not abused    Cognitive Screening   Has your family/caregiver stated any concerns about your memory: no    Patient Care Team   Patient Care Team:  Merlyn Whiting NP as PCP - General (Nurse Practitioner)  Merlyn Whiting NP as PCP - [facility]  Corrine Eisenberg MD (Neurology)  Denisse Boland MD (Cardiology)    Assessment/Plan   Education and counseling provided:  Are appropriate based on today's review and evaluation    Diagnoses and all orders for this visit:    1. Essential hypertension    2. Stage 3 chronic kidney disease ([facility] [address]    3. Bilateral carpal tunnel syndrome  -     REFERRAL TO ORTHOPEDIC SURGERY    4. Medicare annual wellness visit, subsequent    5.  ACP (advance care planning)        Health Maintenance Due   Topic Date Due    Pneumococcal 65+ years (1 of 1 - PPSV23) 09/10/1997    Influenza Age 5 to Adult  08/01/2019    GLAUCOMA SCREENING Q2Y  08/22/2019    Medicare Yearly Exam  09/22/2019

## 2020-02-13 ENCOUNTER — TELEPHONE (OUTPATIENT)
Dept: FAMILY MEDICINE CLINIC | Age: 85
End: 2020-02-13

## 2020-02-13 DIAGNOSIS — I10 ESSENTIAL HYPERTENSION: Primary | ICD-10-CM

## 2020-02-13 NOTE — TELEPHONE ENCOUNTER
Called patient and she wants to do chest xray. Informed her that Gurinder Carvajal has done order and completed form. She will come by office tomorrow or Monday to  form and order. Copy of form in scanning.

## 2020-02-13 NOTE — TELEPHONE ENCOUNTER
----- Message from Selena Arndt sent at 2/13/2020  9:15 AM EST -----  Regarding: Verna Bhatia/ telephone  General Message/Vendor Calls    Caller's first and last name:      Reason for call: Pt is requesting the status of paperwork she left on 2/5 to be completed       Callback required yes/no and why: yes       Best contact number(s):[phone]      Details to clarify the request:      Selena Arndt

## 2020-02-14 ENCOUNTER — DOCUMENTATION ONLY (OUTPATIENT)
Dept: FAMILY MEDICINE CLINIC | Age: 85
End: 2020-02-14

## 2020-02-17 RX ORDER — OSELTAMIVIR PHOSPHATE 75 MG/1
75 CAPSULE ORAL DAILY
Qty: 10 CAP | Refills: 0 | Status: SHIPPED | OUTPATIENT
Start: 2020-02-17 | End: 2020-02-27

## 2020-02-27 ENCOUNTER — HOSPITAL ENCOUNTER (OUTPATIENT)
Dept: GENERAL RADIOLOGY | Age: 85
Discharge: HOME | End: 2020-02-27
Payer: MEDICARE

## 2020-02-27 DIAGNOSIS — I10 ESSENTIAL HYPERTENSION: ICD-10-CM

## 2020-02-27 PROCEDURE — 71046 X-RAY EXAM CHEST 2 VIEWS: CPT

## 2020-03-03 ENCOUNTER — TELEPHONE (OUTPATIENT)
Dept: FAMILY MEDICINE CLINIC | Age: 85
End: 2020-03-03

## 2020-03-03 NOTE — TELEPHONE ENCOUNTER
----- Message from Ariana Evangelista sent at 3/3/2020  7:28 AM EST -----  Regarding: MIGUELITO Bhatia  Patient return call    Caller's first and last name and relationship (if not the patient):      Best contact number(s): [phone]. Whose call is being returned: Not sure who      Details to clarify the request: Pt is returning a phone call from the office.        Ariana Evangelista

## 2020-03-03 NOTE — TELEPHONE ENCOUNTER
----- Message from Estevan Philippe sent at 3/3/2020  2:21 PM EST -----  Regarding: Verna Bhatia/ telephone  Patient return call    Caller's first and last name and relationship (if not the patient):      Best contact number(s):[phone]      Whose call is being returned:Becca       Details to clarify the request:      Estevan Philippe

## 2020-03-03 NOTE — PROGRESS NOTES
[phone] Spoke to patient Identified pt with two pt identifiers (Name @ )  Notified patient of her CXR and understand

## 2020-03-03 NOTE — TELEPHONE ENCOUNTER
[phone] Spoke to patient Identified pt with two pt identifiers (Name @ )  Notified patient of her CXR and understand

## 2020-08-08 ENCOUNTER — TELEPHONE (OUTPATIENT)
Dept: FAMILY MEDICINE CLINIC | Age: 85
End: 2020-08-08

## 2020-08-08 NOTE — TELEPHONE ENCOUNTER
----- Message from Zander Danieleto Ana sent at 8/7/2020  8:09 AM EDT -----  Regarding: MIGUELITO mcclendon/telphone  Contact: [phone]  General Message/Vendor Calls    Caller's first and last name:timmy ve[place]      Reason for call:pt is now at [facility] and has some questions for the doctor.       Callback required yes/no and why:yes      Best contact number(s):[phone]      Details to clarify the request:      Zander Perez    Pt has questions      BCB#[phone]

## 2020-08-11 NOTE — TELEPHONE ENCOUNTER
Returned call to patient. Name and  verified. Patient stated that she did not call but did have a question. States that she is currently in [facility] after having a couple of falls and they needed to know what doctor NP Chito Jordan works under. She stated that she already talked to the nurse though. Patient was appreciative of call.

## 2020-10-05 ENCOUNTER — TELEPHONE (OUTPATIENT)
Dept: FAMILY MEDICINE CLINIC | Age: 85
End: 2020-10-05

## 2020-10-05 NOTE — TELEPHONE ENCOUNTER
Call placed to patient name and  verified. Patient had questions about her flu vaccine but she wanted to speak with NP. Advised patient she was due for follow and I could schedule her as patient had other concerns as well. Phone only appt scheduled as patient is in a facility.

## 2020-10-05 NOTE — TELEPHONE ENCOUNTER
----- Message from Landry Thayer sent at 10/5/2020  8:56 AM EDT -----  Regarding: MIGUELITO Romano / Telephone  Caller's first and last name: self  Reason for call: Pt calling to discuss flu shot being giving at [facility]; she advised she is now living there and the staff directed her to discuss the shot before they give her the shot.   Callback required yes/no and why: Yes  Best contact number(s): [phone]  Details to clarify the request: n/a

## 2020-10-07 ENCOUNTER — VIRTUAL VISIT (OUTPATIENT)
Dept: FAMILY MEDICINE CLINIC | Age: 85
End: 2020-10-07
Payer: MEDICARE

## 2020-10-07 DIAGNOSIS — M85.89 OSTEOPENIA OF MULTIPLE SITES: ICD-10-CM

## 2020-10-07 DIAGNOSIS — I42.8 OTHER CARDIOMYOPATHY (HCC): ICD-10-CM

## 2020-10-07 DIAGNOSIS — N18.31 STAGE 3A CHRONIC KIDNEY DISEASE (HCC): ICD-10-CM

## 2020-10-07 DIAGNOSIS — G56.03 BILATERAL CARPAL TUNNEL SYNDROME: ICD-10-CM

## 2020-10-07 DIAGNOSIS — I10 ESSENTIAL HYPERTENSION: Primary | ICD-10-CM

## 2020-10-07 DIAGNOSIS — E55.9 VITAMIN D DEFICIENCY: ICD-10-CM

## 2020-10-07 PROCEDURE — 99442 PR PHYS/QHP TELEPHONE EVALUATION 11-20 MIN: CPT | Performed by: NURSE PRACTITIONER

## 2020-10-07 NOTE — PROGRESS NOTES
Hieu Bobby is a 80 y.o. female, evaluated via audio-only technology on 10/7/2020 for Hypertension  . Assessment & Plan:   Diagnoses and all orders for this visit:    1. Essential hypertension  Managed by cardiology, no changes.   -     METABOLIC PANEL, COMPREHENSIVE  -     LIPID PANEL  -     CBC WITH AUTOMATED DIFF  -     TSH AND FREE T4    2. Stage 3a chronic kidney disease  Stable, no changes to current therapy    3. Other cardiomyopathy ([address]  Managed by cardiology, no changes.   -     METABOLIC PANEL, COMPREHENSIVE  -     LIPID PANEL  -     CBC WITH AUTOMATED DIFF  -     TSH AND FREE T4    4. Osteopenia of multiple sites  DEXA deferred. Encouraged daily exercise and weight bearing activities. 5. Bilateral carpal tunnel syndrome  Managed by orthopedics. 6. Vitamin D deficiency  -     VITAMIN D, 25 HYDROXY        12  Subjective:     Cardiovascular Review:  The patient has hypertension and cardiomyopathy. Seen by cardiology, Dr. Byron House. Diet and Lifestyle: generally follows a low fat low cholesterol diet, generally follows a low sodium diet, sedentary, nonsmoker  Home BP Monitoring: is well controlled at home, ranging 110/60's-150/90s  Pertinent ROS: taking medications as instructed, no medication side effects noted, no TIA's, no chest pain on exertion, no dyspnea on exertion, no swelling of ankles. Hypertension  Patient is here for follow-up of hypertension. She recently saw cardiologist, Dr. Byron House. Reports having a nuclear stress test. Taking Carvedilol 3.125 mg BID and Losartan 25 mg. Home readings ranging between 130//80s. Osteoarthritis and Chronic Pain:  Patient has osteoarthritis, primarily affecting the neck and back. Symptoms onset: problem is longstanding. Rheumatological ROS:  Using Aspirin daily and  Tramadol intermittently. Response to treatment plan: symptoms have progressed to a point and plateaued. Reports continued burning and tingling in bilateral arms.  Reports diminished  strength at times. Seen by neurology, Dr. Carol Cespedes and advised that she had carpal tunnel syndrome. EMG (5/2018) showed bilateral severe CTS. Prior to Admission medications    Medication Sig Start Date End Date Taking? Authorizing Provider   carvediloL (COREG) 3.125 mg tablet TK 1 T PO BID 1/3/20   Provider, Historical   losartan (COZAAR) 25 mg tablet Take 25 mg by mouth daily. Take 1 tab po daily 7/1/19   Provider, Historical   travoprost (TRAVATAN Z) 0.004 % ophthalmic solution INSTILL 1 GTT ONTO BOTH EYES QHS 2/2/18   Provider, Historical   brinzolamide-brimonidine 1-0.2 % drps SHAKE LQ AND INT 1 GTT IN OU TID 11/2/17   Provider, Historical   cholecalciferol, vitamin D3, (VITAMIN D3) 2,000 unit tab Take  by mouth. Provider, Historical   SIMBRINZA 1-0.2 % drps SHAKE LQ AND INT 1 GTT IN OU TID 11/2/17   Provider, Historical   ibuprofen (ADVIL) 200 mg tablet Take 200 mg by mouth every eight (8) hours as needed for Pain. Provider, Historical       ROS    No flowsheet data found. Catalino Porter, who was evaluated through a patient-initiated, synchronous (real-time) audio only encounter, and/or her healthcare decision maker, is aware that it is a billable service, with coverage as determined by her insurance carrier. She provided verbal consent to proceed: Yes. She has not had a related appointment within my department in the past 7 days or scheduled within the next 24 hours.       Total Time: minutes: 11-20 minutes    Raquel Dior NP

## 2020-11-20 ENCOUNTER — OFFICE VISIT (OUTPATIENT)
Dept: FAMILY MEDICINE CLINIC | Age: 85
End: 2020-11-20
Payer: MEDICARE

## 2020-11-20 ENCOUNTER — TELEPHONE (OUTPATIENT)
Dept: FAMILY MEDICINE CLINIC | Age: 85
End: 2020-11-20

## 2020-11-20 VITALS
BODY MASS INDEX: 27.02 KG/M2 | RESPIRATION RATE: 16 BRPM | TEMPERATURE: 98.6 F | WEIGHT: 162.2 LBS | SYSTOLIC BLOOD PRESSURE: 175 MMHG | HEART RATE: 62 BPM | OXYGEN SATURATION: 95 % | HEIGHT: 65 IN | DIASTOLIC BLOOD PRESSURE: 83 MMHG

## 2020-11-20 DIAGNOSIS — M85.89 OSTEOPENIA OF MULTIPLE SITES: ICD-10-CM

## 2020-11-20 DIAGNOSIS — I10 ESSENTIAL HYPERTENSION: ICD-10-CM

## 2020-11-20 DIAGNOSIS — I42.8 OTHER CARDIOMYOPATHY (HCC): ICD-10-CM

## 2020-11-20 DIAGNOSIS — Z01.818 PREOP EXAMINATION: Primary | ICD-10-CM

## 2020-11-20 DIAGNOSIS — E55.9 VITAMIN D DEFICIENCY: ICD-10-CM

## 2020-11-20 DIAGNOSIS — N18.31 STAGE 3A CHRONIC KIDNEY DISEASE (HCC): ICD-10-CM

## 2020-11-20 LAB
25(OH)D3 SERPL-MCNC: 25 NG/ML (ref 30–100)
ALBUMIN SERPL-MCNC: 3.7 G/DL (ref 3.5–5)
ALBUMIN/GLOB SERPL: 1.4 {RATIO} (ref 1.1–2.2)
ALP SERPL-CCNC: 95 U/L (ref 45–117)
ALT SERPL-CCNC: 19 U/L (ref 12–78)
ANION GAP SERPL CALC-SCNC: 8 MMOL/L (ref 5–15)
AST SERPL-CCNC: 18 U/L (ref 15–37)
BASOPHILS # BLD: 0 K/UL (ref 0–0.1)
BASOPHILS NFR BLD: 0 % (ref 0–1)
BILIRUB SERPL-MCNC: 0.3 MG/DL (ref 0.2–1)
BUN SERPL-MCNC: 18 MG/DL (ref 6–20)
BUN/CREAT SERPL: 18 (ref 12–20)
CALCIUM SERPL-MCNC: 9.4 MG/DL (ref 8.5–10.1)
CHLORIDE SERPL-SCNC: 110 MMOL/L (ref 97–108)
CO2 SERPL-SCNC: 25 MMOL/L (ref 21–32)
CREAT SERPL-MCNC: 1.01 MG/DL (ref 0.55–1.02)
DIFFERENTIAL METHOD BLD: ABNORMAL
EOSINOPHIL # BLD: 0.2 K/UL (ref 0–0.4)
EOSINOPHIL NFR BLD: 3 % (ref 0–7)
ERYTHROCYTE [DISTWIDTH] IN BLOOD BY AUTOMATED COUNT: 14.7 % (ref 11.5–14.5)
GLOBULIN SER CALC-MCNC: 2.7 G/DL (ref 2–4)
GLUCOSE SERPL-MCNC: 87 MG/DL (ref 65–100)
HCT VFR BLD AUTO: 38.1 % (ref 35–47)
HGB BLD-MCNC: 11.6 G/DL (ref 11.5–16)
IMM GRANULOCYTES # BLD AUTO: 0 K/UL (ref 0–0.04)
IMM GRANULOCYTES NFR BLD AUTO: 1 % (ref 0–0.5)
LYMPHOCYTES # BLD: 1.1 K/UL (ref 0.8–3.5)
LYMPHOCYTES NFR BLD: 20 % (ref 12–49)
MCH RBC QN AUTO: 25.4 PG (ref 26–34)
MCHC RBC AUTO-ENTMCNC: 30.4 G/DL (ref 30–36.5)
MCV RBC AUTO: 83.4 FL (ref 80–99)
MONOCYTES # BLD: 0.4 K/UL (ref 0–1)
MONOCYTES NFR BLD: 8 % (ref 5–13)
NEUTS SEG # BLD: 3.7 K/UL (ref 1.8–8)
NEUTS SEG NFR BLD: 68 % (ref 32–75)
NRBC # BLD: 0 K/UL (ref 0–0.01)
NRBC BLD-RTO: 0 PER 100 WBC
PLATELET # BLD AUTO: 224 K/UL (ref 150–400)
PMV BLD AUTO: 12.7 FL (ref 8.9–12.9)
POTASSIUM SERPL-SCNC: 3.8 MMOL/L (ref 3.5–5.1)
PROT SERPL-MCNC: 6.4 G/DL (ref 6.4–8.2)
RBC # BLD AUTO: 4.57 M/UL (ref 3.8–5.2)
SODIUM SERPL-SCNC: 143 MMOL/L (ref 136–145)
TSH SERPL DL<=0.05 MIU/L-ACNC: 1.65 UIU/ML (ref 0.36–3.74)
WBC # BLD AUTO: 5.3 K/UL (ref 3.6–11)

## 2020-11-20 PROCEDURE — 99214 OFFICE O/P EST MOD 30 MIN: CPT | Performed by: NURSE PRACTITIONER

## 2020-11-20 PROCEDURE — G0463 HOSPITAL OUTPT CLINIC VISIT: HCPCS | Performed by: NURSE PRACTITIONER

## 2020-11-20 PROCEDURE — 1090F PRES/ABSN URINE INCON ASSESS: CPT | Performed by: NURSE PRACTITIONER

## 2020-11-20 PROCEDURE — 1101F PT FALLS ASSESS-DOCD LE1/YR: CPT | Performed by: NURSE PRACTITIONER

## 2020-11-20 PROCEDURE — G8432 DEP SCR NOT DOC, RNG: HCPCS | Performed by: NURSE PRACTITIONER

## 2020-11-20 PROCEDURE — G8536 NO DOC ELDER MAL SCRN: HCPCS | Performed by: NURSE PRACTITIONER

## 2020-11-20 PROCEDURE — G8419 CALC BMI OUT NRM PARAM NOF/U: HCPCS | Performed by: NURSE PRACTITIONER

## 2020-11-20 PROCEDURE — G8427 DOCREV CUR MEDS BY ELIG CLIN: HCPCS | Performed by: NURSE PRACTITIONER

## 2020-11-20 RX ORDER — LOSARTAN POTASSIUM 25 MG/1
50 TABLET ORAL DAILY
Qty: 180 TAB | Refills: 1 | Status: SHIPPED | OUTPATIENT
Start: 2020-11-20 | End: 2020-12-08

## 2020-11-20 RX ORDER — CLONIDINE HYDROCHLORIDE 0.1 MG/1
1 TABLET ORAL AS NEEDED
COMMUNITY

## 2020-11-20 NOTE — TELEPHONE ENCOUNTER
Spoke with provider regarding patients call.  He stated that she can take another dose of her clonidine or her losartan and monitor her readings

## 2020-11-20 NOTE — PROGRESS NOTES
Preoperative Evaluation    Date of Exam: 2020    Chun Landeros is a 80 y.o. female (:9/10/1932) who presents for preoperative evaluation. Procedure/Surgery: Bilateral Cataract extraction with lens implant  Date of Procedure/Surgery: 20  Surgeon: Dr. Octaviano Evangelista: [facility]  Primary Physician: Gerhardt Moellers, NP  Latex Allergy: no    Medical History:     Past Medical History:   Diagnosis Date    AR (allergic rhinitis) 2010    Asthmatic bronchitis 2010    DJD (degenerative joint disease) 2010    Glaucoma 2010    HTN (hypertension) 2010    Mixed hyperlipidemia     normal particle number 2012    Osteopenia 2010    TMJ (temporomandibular joint disorder) 2010    Viral labyrinthitis 2010     Allergies: Allergies   Allergen Reactions    Asmanex Twisthaler [Mometasone] Other (comments) and Nausea and Vomiting     Dizziness and mouth sores  Dizziness and mouth sores    Fosamax [Alendronate] Other (comments) and Nausea and Vomiting     Leg cramps  Leg cramps    Prednisone Anxiety      Medications:     Current Outpatient Medications   Medication Sig    carvediloL (COREG) 3.125 mg tablet TK 1 T PO BID    losartan (COZAAR) 25 mg tablet Take 25 mg by mouth daily. Take 1 tab po daily    travoprost (TRAVATAN Z) 0.004 % ophthalmic solution INSTILL 1 GTT ONTO BOTH EYES QHS    brinzolamide-brimonidine 1-0.2 % drps SHAKE LQ AND INT 1 GTT IN OU TID    cholecalciferol, vitamin D3, (VITAMIN D3) 2,000 unit tab Take  by mouth.  SIMBRINZA 1-0.2 % drps SHAKE LQ AND INT 1 GTT IN OU TID    ibuprofen (ADVIL) 200 mg tablet Take 200 mg by mouth every eight (8) hours as needed for Pain.  cloNIDine HCL (CATAPRES) 0.1 mg tablet Take 1 Tab by mouth as needed. No current facility-administered medications for this visit.       Surgical History:     Past Surgical History:   Procedure Laterality Date    ENDOSCOPY, COLON, DIAGNOSTIC      HX APPENDECTOMY      HX HERNIA REPAIR      HX MOHS PROCEDURES      HX TUBAL LIGATION      HX WRIST FRACTURE [address]  08/16/13     Social History:     Social History     Socioeconomic History    Marital status:      Spouse name: Not on file    Number of children: Not on file    Years of education: Not on file    Highest education level: Not on file   Tobacco Use    Smoking status: Former Smoker     Packs/day: 0.20     Years: 3.00     Pack years: 0.60    Smokeless tobacco: Never Used   Substance and Sexual Activity    Alcohol use: No    Drug use: No    Sexual activity: Not Currently     Partners: Male   Other Topics Concern     Service No    Blood Transfusions No    Caffeine Concern No    Occupational Exposure No    Hobby Hazards No    Sleep Concern Yes     Comment: family health issues    Stress Concern Yes    Weight Concern No    Special Diet No    Back Care No    Exercise No    Bike Helmet No    Seat Belt Yes    Self-Exams Yes       Recent use of: No recent use of aspirin (ASA), NSAIDS or steroids    Anesthesia Complications: None  History of abnormal bleeding : None  History of Blood Transfusions: no  Health Care Directive or Living Will: yes     REVIEW OF SYSTEMS:  A comprehensive review of systems was negative. Physical Exam  Visit Vitals  BP (!) 175/83 (BP 1 Location: Left arm, BP Patient Position: Sitting)   Pulse 62   Temp 98.6 °F (37 °C) (Temporal)   Resp 16   Ht 5' 5\" (1.651 m)   Wt 162 lb 3.2 oz (73.6 kg)   SpO2 95%   BMI 26.99 kg/m²     General appearance: alert, well appearing, and in no distress. Chest: clear to auscultation, no wheezes, rales or rhonchi, symmetric air entry. CVS exam: normal rate, regular rhythm, normal S1, S2, no murmurs, rubs, clicks or gallops, normal bilateral carotid upstroke without bruits, no JVD.   Exam of extremities: peripheral pulses normal, no pedal edema, no clubbing or cyanosis      IMPRESSION:   Based on the above evaluation, the patient is stable and cleared for surgery.         Clayton Jung NP   11/20/2020

## 2020-11-20 NOTE — PROGRESS NOTES
Chief Complaint   Patient presents with    Pre-op Exam     cataract surgery 11/30/20 dr Sumaya Dang      1. Have you been to the ER, urgent care clinic since your last visit? Hospitalized since your last visit? No    2. Have you seen or consulted any other health care providers outside of the [address] [place] [address] since your last visit? Include any pap smears or colon screening.  Yes When: Dr Lorena Dickerson eye surgeon

## 2020-11-20 NOTE — TELEPHONE ENCOUNTER
Pt called jesus stating that she was in the office today and when Diana Blake checked her BP it worried him and he had her take a medication in office and when she got home her BP duke even more and needs advise on what to do. Call got disconnected when jesus tried transferring pt to me.     Pt needs a call back from the nurse    BCB# [phone]

## 2020-11-23 ENCOUNTER — TELEPHONE (OUTPATIENT)
Dept: FAMILY MEDICINE CLINIC | Age: 85
End: 2020-11-23

## 2020-11-23 NOTE — TELEPHONE ENCOUNTER
----- Message from [place] sent at 11/20/2020  3:31 PM EST -----  Regarding: MIGUELITO Bhatia/Telephone  Level 1/Escalated Issue      Caller's first and last name and relationship (if not the patient): n/a      Best contact number(s):[phone]      What are the symptoms: Pt was seen in office today and blood pressure was high at 175 and took a Klonodine. Pt is now calling back with a blood pressure of 189. Would like to know if she should take another Klonodine. Transfer successful - yes/no (include outcome):no - I put pt on hold to get a nurse on the line, when I tried to connect her with the nurse she had hung up. I tried to call the patient back but the line was busy. [facility] was going to send a message to the nurse. Transfer declined - yes/no (include reason): n/a      Was caller advised to seek appropriate level of care - yes/no: no - patient hung up before call could be completed.        Details to clarify the request: Toby Payne

## 2020-12-03 ENCOUNTER — TELEPHONE (OUTPATIENT)
Dept: FAMILY MEDICINE CLINIC | Age: 85
End: 2020-12-03

## 2020-12-03 NOTE — TELEPHONE ENCOUNTER
Received incoming call from patient through [employer] as a level one call. Patients name and  verified. Per patient she is concerned about her BP readings being elevated. Patient states that on 20 at 0930 her BP was 176/91 after taking her medications. 20 at 1600 reading of 192/98. Took her PRN clonidine and rechecked at 1645 per patient systolic reading was 493 but did not write down her diastolic. Patient checked BP at 1745 on 20 reading of 193/104 again took her PRN clonidine and 133/73 at recheck. 20 at around 1530 patient checked her pressure and read 201/94. Per patient she has left messages for her cardiologist and they have not called her back. She is concerned as she feels fine when they are elevated and is very scared that she will have a stroke. Advised I will notify provider of recent readings. She was appreciative of call. Provider notified.

## 2020-12-04 NOTE — TELEPHONE ENCOUNTER
Spoke with patient re: BP. Will increase Losartan to 50 mg QAM and 25 mg QPM. Patient to follow up with cardiology on 1/11/21. Discussed lab results with patient.

## 2020-12-07 ENCOUNTER — TELEPHONE (OUTPATIENT)
Dept: FAMILY MEDICINE CLINIC | Age: 85
End: 2020-12-07

## 2020-12-07 DIAGNOSIS — I10 ESSENTIAL HYPERTENSION: ICD-10-CM

## 2020-12-07 NOTE — TELEPHONE ENCOUNTER
Received fax from pharmacy requesting updated RX for Losartan to take three tabs daily, so that insurance will cover.

## 2020-12-08 RX ORDER — LOSARTAN POTASSIUM 25 MG/1
75 TABLET ORAL DAILY
Qty: 270 TAB | Refills: 1 | Status: SHIPPED | OUTPATIENT
Start: 2020-12-08

## 2020-12-22 ENCOUNTER — TELEPHONE (OUTPATIENT)
Dept: FAMILY MEDICINE CLINIC | Age: 85
End: 2020-12-22

## 2020-12-22 NOTE — TELEPHONE ENCOUNTER
Pt would like to discuss the COVID vacc, the [facility] will be administering the vaccinations for [facility] where she is a resident now, pt is in quarantine, and would like to know if it is okay for her to received the vaccination, because she was unsure do to all her medications she is on and how high her BP stays. Pt stated that the pharmacy advised her to speak with Taran Britton or his nurse today.       BCB# [phone]

## 2020-12-23 NOTE — TELEPHONE ENCOUNTER
Pt called making sure that the message was sent to brittany    Pt was advised that brittany has gotten the message and he will answer back as soon as he's available,she was advised that he is out of office for the rest of the week and will most likely hear from him next week    Pt verbalized understanding and hopes we all have a soniary liset!

## 2020-12-29 NOTE — TELEPHONE ENCOUNTER
Returned call to patient. Name and  verified. Advised patient that per MIGUELITO Bhatia. It is ok for her to receive the covid vaccine at her facility.  She was appreciative of call

## 2021-01-28 ENCOUNTER — TELEPHONE (OUTPATIENT)
Dept: FAMILY MEDICINE CLINIC | Age: 86
End: 2021-01-28

## 2021-01-28 NOTE — TELEPHONE ENCOUNTER
Returned call to patient name and  verified. Advised patient we could complete the forms if she has them dropped off.

## 2021-01-28 NOTE — TELEPHONE ENCOUNTER
----- Message from Juaquin Morales sent at 1/28/2021  8:56 AM EST -----  Regarding: Jannette/Telephone  Contact: [phone]  General Message/Vendor Calls    Caller's first and last name: N/A      Reason for call: Form for Eye Surgery on February 20. Callback required yes/no and why: Yes/Confirm      Best contact number(s):[phone]      Details to clarify the request: Patient has a form for her eye surgery. Does she needs to make an appointment with MIGUELITO Bhatia or an she just drop the form off at the office and office can fax it over to her ophthalmologist. Patient already called two times on this but no response from the office.         Juaquin Morales

## 2021-01-28 NOTE — TELEPHONE ENCOUNTER
Patient states she had is not sure whether she will have to been seen since she had eye surgery in November and now she is having the other one done. Neno Larson is currently under quarantine, so if she does not have to come in then she have someone bring the form for completion.  Please call her, she seems confused and didn't really trust what I told her       BCB#[phone]

## 2021-01-29 ENCOUNTER — TELEPHONE (OUTPATIENT)
Dept: FAMILY MEDICINE CLINIC | Age: 86
End: 2021-01-29

## 2021-01-29 NOTE — TELEPHONE ENCOUNTER
Patient called stated her daughter brought a form for Abigail Gracia to fill out . She's having surgery and wanted to make sure Heavenlygera Jammie got the form.     Best call back # [phone]

## 2021-01-29 NOTE — TELEPHONE ENCOUNTER
Returned call to patient name and  verified. Advised patient I have received her form and will have MIGUELITO Bhatia complete for her surgery.

## 2021-02-16 ENCOUNTER — TELEPHONE (OUTPATIENT)
Dept: FAMILY MEDICINE CLINIC | Age: 86
End: 2021-02-16

## 2021-02-16 NOTE — TELEPHONE ENCOUNTER
Pt called stating she has had an emergency today pt has had severe dizziness and it is not from High BP because she has been monitoring her bp and is wondering if her inner ear is flaring up and when she gets into the bed she feels like the bed is floating, and she stated she turned over last night she was extremely dizzy and felt like her dresser was coming towards her. The patient is supposed to have cataract surgery Monday and she can not walk right now without holding onto the furniture.

## 2021-04-30 ENCOUNTER — TRANSCRIBE ORDER (OUTPATIENT)
Dept: SCHEDULING | Age: 86
End: 2021-04-30

## 2021-04-30 DIAGNOSIS — Z12.31 VISIT FOR SCREENING MAMMOGRAM: Primary | ICD-10-CM

## 2021-04-30 DIAGNOSIS — M81.0 AGE-RELATED OSTEOPOROSIS WITHOUT CURRENT PATHOLOGICAL FRACTURE: ICD-10-CM

## 2021-05-27 ENCOUNTER — HOSPITAL ENCOUNTER (OUTPATIENT)
Dept: MAMMOGRAPHY | Age: 86
Discharge: HOME | End: 2021-05-27
Attending: INTERNAL MEDICINE
Payer: MEDICARE

## 2021-05-27 DIAGNOSIS — M81.0 AGE-RELATED OSTEOPOROSIS WITHOUT CURRENT PATHOLOGICAL FRACTURE: ICD-10-CM

## 2021-05-27 DIAGNOSIS — Z12.31 VISIT FOR SCREENING MAMMOGRAM: ICD-10-CM

## 2021-05-27 PROCEDURE — 77080 DXA BONE DENSITY AXIAL: CPT

## 2021-05-27 PROCEDURE — 77063 BREAST TOMOSYNTHESIS BI: CPT

## 2022-03-18 PROBLEM — Z71.89 ACP (ADVANCE CARE PLANNING): Status: ACTIVE | Noted: 2017-04-26

## 2022-03-18 PROBLEM — N18.30 STAGE 3 CHRONIC KIDNEY DISEASE (HCC): Status: ACTIVE | Noted: 2017-11-29

## 2022-03-19 PROBLEM — G56.03 BILATERAL CARPAL TUNNEL SYNDROME: Status: ACTIVE | Noted: 2019-02-21

## 2022-05-16 ENCOUNTER — TRANSCRIBE ORDER (OUTPATIENT)
Dept: SCHEDULING | Age: 87
End: 2022-05-16

## 2022-05-16 DIAGNOSIS — Z12.39 ENCOUNTER FOR OTHER SCREENING FOR MALIGNANT NEOPLASM OF BREAST: Primary | ICD-10-CM

## 2022-05-17 ENCOUNTER — TRANSCRIBE ORDER (OUTPATIENT)
Dept: SCHEDULING | Age: 87
End: 2022-05-17

## 2022-05-17 DIAGNOSIS — Z12.31 ENCOUNTER FOR SCREENING MAMMOGRAM FOR MALIGNANT NEOPLASM OF BREAST: Primary | ICD-10-CM

## 2023-04-23 DIAGNOSIS — Z12.31 ENCOUNTER FOR SCREENING MAMMOGRAM FOR MALIGNANT NEOPLASM OF BREAST: Primary | ICD-10-CM

## 2023-06-13 NOTE — PROGRESS NOTES
1. Have you been to the ER, urgent care clinic since your last visit? Hospitalized since your last visit? No    2. Have you seen or consulted any other health care providers outside of the [address] [place] [address] since your last visit? Include any pap smears or colon screening.  Flu shot at [facility] show

## 2023-08-04 ENCOUNTER — HOSPITAL ENCOUNTER (OUTPATIENT)
Facility: HOSPITAL | Age: 88
End: 2023-08-04
Attending: INTERNAL MEDICINE
Payer: MEDICARE

## 2023-08-04 DIAGNOSIS — M85.89 OTHER SPECIFIED DISORDERS OF BONE DENSITY AND STRUCTURE, MULTIPLE SITES: ICD-10-CM

## 2023-08-04 PROCEDURE — 77080 DXA BONE DENSITY AXIAL: CPT

## 2024-06-13 ENCOUNTER — HOSPITAL ENCOUNTER (OUTPATIENT)
Facility: HOSPITAL | Age: 89
Discharge: HOME | End: 2024-06-13
Attending: INTERNAL MEDICINE
Payer: MEDICARE

## 2024-06-13 ENCOUNTER — TRANSCRIBE ORDERS (OUTPATIENT)
Facility: HOSPITAL | Age: 89
End: 2024-06-13

## 2024-06-13 DIAGNOSIS — M47.812 CERVICAL SPONDYLOSIS WITHOUT MYELOPATHY: Primary | ICD-10-CM

## 2024-06-13 DIAGNOSIS — M47.812 CERVICAL SPONDYLOSIS WITHOUT MYELOPATHY: ICD-10-CM

## 2024-06-13 PROCEDURE — 72040 X-RAY EXAM NECK SPINE 2-3 VW: CPT
